# Patient Record
Sex: FEMALE | Race: WHITE | NOT HISPANIC OR LATINO | Employment: OTHER | URBAN - METROPOLITAN AREA
[De-identification: names, ages, dates, MRNs, and addresses within clinical notes are randomized per-mention and may not be internally consistent; named-entity substitution may affect disease eponyms.]

---

## 2018-08-09 ENCOUNTER — PES CALL (OUTPATIENT)
Dept: ADMINISTRATIVE | Facility: CLINIC | Age: 83
End: 2018-08-09

## 2019-01-16 ENCOUNTER — HOSPITAL ENCOUNTER (EMERGENCY)
Facility: HOSPITAL | Age: 84
Discharge: NURSING, REHAB OR LONG-TERM CARE | End: 2019-01-16
Attending: EMERGENCY MEDICINE
Payer: MEDICARE

## 2019-01-16 VITALS
HEIGHT: 69 IN | OXYGEN SATURATION: 95 % | DIASTOLIC BLOOD PRESSURE: 80 MMHG | HEART RATE: 84 BPM | TEMPERATURE: 98 F | BODY MASS INDEX: 21.33 KG/M2 | SYSTOLIC BLOOD PRESSURE: 150 MMHG | RESPIRATION RATE: 18 BRPM | WEIGHT: 144 LBS
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
## 2019-01-16 DIAGNOSIS — F03.90 DEMENTIA WITHOUT BEHAVIORAL DISTURBANCE, UNSPECIFIED DEMENTIA TYPE: ICD-10-CM

## 2019-01-16 PROCEDURE — 87086 URINE CULTURE/COLONY COUNT: CPT | Mod: HCNC

## 2019-01-16 PROCEDURE — 99284 EMERGENCY DEPT VISIT MOD MDM: CPT | Mod: HCNC

## 2019-01-16 PROCEDURE — 87088 URINE BACTERIA CULTURE: CPT | Mod: HCNC

## 2019-01-16 PROCEDURE — 87186 SC STD MICRODIL/AGAR DIL: CPT | Mod: HCNC

## 2019-01-16 PROCEDURE — 87077 CULTURE AEROBIC IDENTIFY: CPT | Mod: HCNC

## 2019-01-16 NOTE — ED PROVIDER NOTES
Encounter Date: 1/16/2019       History     Chief Complaint   Patient presents with    Fall     fropm [facility], from low bed to floor, reports back and buttock pain but upon being picked up from floor pt reports pain is gone, unwitnessed      This is an emergent evaluation of an 87-year-old female who presents via [facility] having slipped out of her low bed onto the floor.  When EMS arrived, the patient was lying on the floor.  When they picked her up, she had no complaints. The patient suffers from dementia and this limits her history.    PMH: CAD, SLE, ITP, osteoporosis, osteoarthritis, DDD, hammer toe, depression  PSH: splenectomy, hysterectomy, hip surgery, knee surgery          Review of patient's allergies indicates:   Allergen Reactions    Penicillins Rash     Past Medical History:   Diagnosis Date    Allergy     Arthritis     CAD (coronary artery disease) 11/26/2013    Chronic pain 5/1/2013    DDD (degenerative disc disease), thoracic 10/4/2012    Depression 5/1/2013    Encounter for blood transfusion     Hammer toe 5/1/2013    Idiopathic thrombocytopenic purpura (ITP) 1/28/2013    Osteoarthritis 10/4/2012    Osteoporosis, postmenopausal 11/26/2013    Positive NAZANIN (antinuclear antibody) 1/28/2013    SLE (systemic lupus erythematosus) 5/1/2013    Spondylolysis of lumbar region 10/4/2012     Past Surgical History:   Procedure Laterality Date    ARTHROPLASTY-HIP Left 4/16/2015    Performed by [facility] at [facility] OR    ARTHROPLASTY-HIP-AGATA (ENDOPROSTHESIS) Left 3/24/2015    Performed by Joao Hilliard III, MD at [facility] OR    BIOPSY Left 4/16/2015    [facility] by [facility] at [facility] OR    CYSTOSCOPY with bilateral  RETROGRADE and bilateral URETEROSCOPY with cystolithopaxy 2cm,  RIGHT holmium laser litho, bilateral STENT, villa  PLACEMENT Bilateral 6/9/2015    Performed by Maik Mckeon [facility] at [facility] OR    CYSTOSCOPY WITH RETROGRADE PYELOGRAM  Bilateral 2015    [facility]    HYSTERECTOMY      INCISION AND DRAINAGE (I & D) Left 2015    [facility]formed by [facility] at [facility] OR    PLACEMENT ROSENTHAL CATHETER N/A 2015    [facility]formed by [facility] at [facility] OR    PLACEMENT-STENT URETERAL Right 2015    Performed by [facility] at [facility] OR    REVISION-ARTHROPLASTY-HIP AGATA TO TOTAL Left 2015    [facility]formed by [facility] at [facility] OR    right hip athroplasty Right     SPLENECTOMY, TOTAL      TOTAL KNEE ARTHROPLASTY Left      Family History   Problem Relation Age of Onset    Arthritis Mother     Cancer Sister     Cancer Brother      Social History     Tobacco Use    Smoking status: Former Smoker     Start date: 1950     Last attempt to quit: 1964     Years since quittin.9    Smokeless tobacco: Never Used   Substance Use Topics    Alcohol use: No    Drug use: No     Review of Systems   Psychiatric/Behavioral: Positive for confusion (baseline).       Physical Exam     Initial Vitals [19 0344]   BP Pulse Resp Temp SpO2   (!) 140/89 74 16 97.6 °F (36.4 °C) 98 %      MAP       --         Physical Exam    Nursing note and vitals reviewed.  Constitutional: She appears well-developed and well-nourished. She is not diaphoretic.   Awake, alert, nontoxic elderly woman   HENT:   Head: Normocephalic and atraumatic.   Mouth/Throat: Oropharynx is clear and moist.   Eyes: Conjunctivae and EOM are normal. Pupils are equal, round, and reactive to light.   Neck: Normal range of motion. Neck supple.   Cardiovascular: Normal rate, regular rhythm and intact distal pulses.   Pulmonary/Chest: Breath sounds normal. No respiratory distress. She has no wheezes. She has no rhonchi. She has no rales.   Abdominal: Soft. There is no tenderness.   Musculoskeletal: Normal range of motion. She exhibits no tenderness.   Bilateral hammer toes. Compression socks bilat.    Neurological: She is alert. She has normal strength.   Moving all extremities. Patient states she lives in [place] on a property her  bought 3 years ago.   Skin: Skin is warm and dry. No erythema. No pallor.   Healing skin tear LUE.   Psychiatric: Her behavior is normal.         ED Course   Procedures  Labs Reviewed - No data to display       Imaging Results    None       X-Rays:   Independently Interpreted Readings:   Other Readings:  CXR NAD    Medical Decision Making:   History:   I obtained history from: EMS provider.  Old Medical Records: I decided to obtain old medical records.  Old Records Summarized: records from clinic visits and records from previous admission(s).  Initial Assessment:   87 y.o. Female with dementia s/p minor fall.  Differential Diagnosis:   Ddx includes ICH, skull fx, cspine fx, rib fx, PTX, pelvic fx, hip fx, other.  Independently Interpreted Test(s):   I have ordered and independently interpreted X-rays - see prior notes.  Clinical Tests:   Lab Tests: Ordered  Radiological Study: Ordered and Reviewed  ED Management:  Imaging reassuring.     Vitals also reassuring -- minimally hypertensive.     RN did note foul-smelling urine so we sent this for culture. Patient has grown out multiple organisms in the past and as she is not febrile, tachycardic, or altered from her baseline, I feel waiting for cx to tx is reasonable.    Dc'ed back to NH via EMS.                       Clinical Impression:   The primary encounter diagnosis was Fall. A diagnosis of Dementia without behavioral disturbance, unspecified dementia type was also pertinent to this visit.                             Marian White MD  01/18/19 0002

## 2019-01-16 NOTE — ED TRIAGE NOTES
Pt presents to ED via EMS from [facility] after a unwitnessed fall. Pt was found on floor by staff, bed was in lowest position. Pt reported pain in lower back and sacral area but reports pain went away upon being removed from floor. Pt is disoriented which staff report is baseline. Pt in no acute distress at this time. Will continue to be monitored.

## 2019-01-18 ENCOUNTER — TELEPHONE (OUTPATIENT)
Dept: EMERGENCY MEDICINE | Facility: HOSPITAL | Age: 84
End: 2019-01-18

## 2019-01-18 LAB — BACTERIA UR CULT: NORMAL

## 2019-01-18 NOTE — TELEPHONE ENCOUNTER
Pt is resident of [facility]. Spoke with Zara Jacob LPN. Prescribing Macrobid 100mg PO bid x7 days for +urine culture.

## 2019-02-08 ENCOUNTER — PES CALL (OUTPATIENT)
Dept: ADMINISTRATIVE | Facility: CLINIC | Age: 84
End: 2019-02-08

## 2019-03-17 ENCOUNTER — HOSPITAL ENCOUNTER (INPATIENT)
Facility: HOSPITAL | Age: 84
LOS: 5 days | Discharge: NURSING, REHAB OR LONG-TERM CARE | DRG: 956 | End: 2019-03-22
Attending: EMERGENCY MEDICINE | Admitting: EMERGENCY MEDICINE
Payer: MEDICARE

## 2019-03-17 DIAGNOSIS — Z01.818 PRE-OP EXAM: ICD-10-CM

## 2019-03-17 DIAGNOSIS — M79.605 LEFT LEG PAIN: ICD-10-CM
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
## 2019-03-17 DIAGNOSIS — S72.402A CLOSED FRACTURE OF DISTAL END OF LEFT FEMUR, UNSPECIFIED FRACTURE MORPHOLOGY, INITIAL ENCOUNTER: Primary | ICD-10-CM

## 2019-03-17 LAB
ALBUMIN SERPL BCP-MCNC: 3.3 G/DL
ALP SERPL-CCNC: 161 U/L
ALT SERPL W/O P-5'-P-CCNC: 8 U/L
ANION GAP SERPL CALC-SCNC: 10 MMOL/L
AST SERPL-CCNC: 19 U/L
BASOPHILS # BLD AUTO: 0.05 K/UL
BASOPHILS NFR BLD: 0.6 %
BILIRUB SERPL-MCNC: 0.5 MG/DL
BUN SERPL-MCNC: 26 MG/DL
CALCIUM SERPL-MCNC: 9.7 MG/DL
CHLORIDE SERPL-SCNC: 107 MMOL/L
CO2 SERPL-SCNC: 26 MMOL/L
CREAT SERPL-MCNC: 0.9 MG/DL
DIFFERENTIAL METHOD: ABNORMAL
EOSINOPHIL # BLD AUTO: 0.1 K/UL
EOSINOPHIL NFR BLD: 1.5 %
ERYTHROCYTE [DISTWIDTH] IN BLOOD BY AUTOMATED COUNT: 15.4 %
EST. GFR  (AFRICAN AMERICAN): >60 ML/MIN/1.73 M^2
EST. GFR  (NON AFRICAN AMERICAN): 58 ML/MIN/1.73 M^2
GLUCOSE SERPL-MCNC: 94 MG/DL
HCT VFR BLD AUTO: 39.1 %
HGB BLD-MCNC: 12.5 G/DL
INR PPP: 1.1
LYMPHOCYTES # BLD AUTO: 2.3 K/UL
LYMPHOCYTES NFR BLD: 26.9 %
MCH RBC QN AUTO: 30.7 PG
MCHC RBC AUTO-ENTMCNC: 32 G/DL
MCV RBC AUTO: 96 FL
MONOCYTES # BLD AUTO: 0.6 K/UL
MONOCYTES NFR BLD: 7.2 %
NEUTROPHILS # BLD AUTO: 5.4 K/UL
NEUTROPHILS NFR BLD: 63.8 %
PLATELET # BLD AUTO: 305 K/UL
PMV BLD AUTO: 9.7 FL
POTASSIUM SERPL-SCNC: 4.5 MMOL/L
PROT SERPL-MCNC: 8 G/DL
PROTHROMBIN TIME: 11.4 SEC
RBC # BLD AUTO: 4.07 M/UL
SODIUM SERPL-SCNC: 143 MMOL/L
WBC # BLD AUTO: 8.39 K/UL

## 2019-03-17 PROCEDURE — 11000001 HC ACUTE MED/SURG PRIVATE ROOM: Mod: HCNC

## 2019-03-17 PROCEDURE — 80053 COMPREHEN METABOLIC PANEL: CPT | Mod: HCNC

## 2019-03-17 PROCEDURE — 96374 THER/PROPH/DIAG INJ IV PUSH: CPT | Mod: HCNC

## 2019-03-17 PROCEDURE — 93010 ELECTROCARDIOGRAM REPORT: CPT | Mod: HCNC,,, | Performed by: INTERNAL MEDICINE

## 2019-03-17 PROCEDURE — 93005 ELECTROCARDIOGRAM TRACING: CPT | Mod: HCNC

## 2019-03-17 PROCEDURE — 63600175 PHARM REV CODE 636 W HCPCS: Mod: HCNC | Performed by: EMERGENCY MEDICINE

## 2019-03-17 PROCEDURE — 85025 COMPLETE CBC W/AUTO DIFF WBC: CPT | Mod: HCNC

## 2019-03-17 PROCEDURE — 93010 EKG 12-LEAD: ICD-10-PCS | Mod: HCNC,,, | Performed by: INTERNAL MEDICINE

## 2019-03-17 PROCEDURE — 85610 PROTHROMBIN TIME: CPT | Mod: HCNC

## 2019-03-17 PROCEDURE — 99285 EMERGENCY DEPT VISIT HI MDM: CPT | Mod: 25,HCNC

## 2019-03-17 RX ORDER — SODIUM CHLORIDE 9 MG/ML
1000 INJECTION, SOLUTION INTRAVENOUS
Status: ACTIVE | OUTPATIENT
Start: 2019-03-17 | End: 2019-03-18

## 2019-03-17 RX ORDER — SODIUM CHLORIDE 0.9 % (FLUSH) 0.9 %
5 SYRINGE (ML) INJECTION
Status: DISCONTINUED | OUTPATIENT
Start: 2019-03-17 | End: 2019-03-22 | Stop reason: HOSPADM

## 2019-03-17 RX ORDER — MORPHINE SULFATE 10 MG/ML
2 INJECTION INTRAMUSCULAR; INTRAVENOUS; SUBCUTANEOUS EVERY 4 HOURS PRN
Status: DISCONTINUED | OUTPATIENT
Start: 2019-03-17 | End: 2019-03-18

## 2019-03-17 RX ORDER — ONDANSETRON 2 MG/ML
4 INJECTION INTRAMUSCULAR; INTRAVENOUS EVERY 8 HOURS PRN
Status: DISCONTINUED | OUTPATIENT
Start: 2019-03-17 | End: 2019-03-18

## 2019-03-17 RX ORDER — ACETAMINOPHEN 325 MG/1
650 TABLET ORAL EVERY 8 HOURS PRN
Status: DISCONTINUED | OUTPATIENT
Start: 2019-03-17 | End: 2019-03-19

## 2019-03-17 RX ORDER — MORPHINE SULFATE 10 MG/ML
2 INJECTION INTRAMUSCULAR; INTRAVENOUS; SUBCUTANEOUS
Status: COMPLETED | OUTPATIENT
Start: 2019-03-17 | End: 2019-03-17

## 2019-03-17 RX ORDER — PANTOPRAZOLE SODIUM 40 MG/10ML
40 INJECTION, POWDER, LYOPHILIZED, FOR SOLUTION INTRAVENOUS DAILY
Status: DISCONTINUED | OUTPATIENT
Start: 2019-03-18 | End: 2019-03-22 | Stop reason: HOSPADM

## 2019-03-17 RX ADMIN — MORPHINE SULFATE 2 MG: 10 INJECTION INTRAVENOUS at 06:03

## 2019-03-17 NOTE — ED PROVIDER NOTES
Encounter Date: 3/17/2019       History     Chief Complaint   Patient presents with    Fall     From [facility] after trying to transfer from her bed to wheelchair. Fall was unwitnessed. Pt c/o lower back and left knee pain     Ms Sabrina reports her L knee hurts. She states she was out and about with her  and some friends and she had a little fall and came here.  She says she like for someone to help her get dressed to she can go back home but she hurts in her knee and her leg a little bit.  EMS states she told nursing home and EMS personnel that she was transferring from bed to wheelchair and she slipped and fell and hurt in her low back and her left knee a little.  MS states the fall was unwitnessed.  Patient has a history of dementia and is at her baseline mentally per the nursing home.  She tells me her left knee hurts and she points to her distal left thigh and knee.      The history is provided by the patient and the EMS personnel.     Review of patient's allergies indicates:   Allergen Reactions    Penicillins Rash     Past Medical History:   Diagnosis Date    Allergy     Arthritis     CAD (coronary artery disease) 11/26/2013    Chronic pain 5/1/2013    DDD (degenerative disc disease), thoracic 10/4/2012    Depression 5/1/2013    Encounter for blood transfusion     Hammer toe 5/1/2013    Idiopathic thrombocytopenic purpura (ITP) 1/28/2013    Osteoarthritis 10/4/2012    Osteoporosis, postmenopausal 11/26/2013    Positive NAZANIN (antinuclear antibody) 1/28/2013    SLE (systemic lupus erythematosus) 5/1/2013    Spondylolysis of lumbar region 10/4/2012     Past Surgical History:   Procedure Laterality Date    ARTHROPLASTY-HIP Left 4/16/2015    [facility]    ARTHROPLASTY-HIP-AGATA (ENDOPROSTHESIS) Left 3/24/2015    Performed by [facility]ao Hilliard III, MD at [facility] OR    BIOPSY Left 4/16/2015    Performed by Hsamukh [facility]bar [facility] at [facility] OR    CYSTOSCOPY  with bilateral  RETROGRADE and bilateral URETEROSCOPY with cystolithopaxy 2cm,  RIGHT holmium laser litho, bilateral STENT, villa  PLACEMENT Bilateral 2015    [facility] by [facility] at [facility] OR    CYSTOSCOPY WITH RETROGRADE PYELOGRAM Bilateral 2015    Performed by [facility] at [facility] OR    HYSTERECTOMY      INCISION AND DRAINAGE (I & D) Left 2015    [facility]formed by [facility] at [facility] OR    PLACEMENT VILLA CATHETER N/A 2015    Performed by [facility] at [facility] OR    PLACEMENT-STENT URETERAL Right 2015    [facility]formed by [facility] at [facility] OR    REVISION-ARTHROPLASTY-HIP AGATA TO TOTAL Left 2015    [facility]formed by [facility] at [facility] OR    right hip athroplasty Right     SPLENECTOMY, TOTAL      TOTAL KNEE ARTHROPLASTY Left      Family History   Problem Relation Age of Onset    Arthritis Mother     Cancer Sister     Cancer Brother      Social History     Tobacco Use    Smoking status: Former Smoker     Start date: 1950     Last attempt to quit: 1964     Years since quittin.0    Smokeless tobacco: Never Used   Substance Use Topics    Alcohol use: No    Drug use: No     Review of Systems   Reason unable to perform ROS: Unable to obtain a full ROS due to dementia.   Musculoskeletal:        Positive reported left knee pain and thigh pain per patient   All other systems reviewed and are negative.      Physical Exam     Initial Vitals [19 1606]   BP Pulse Resp Temp SpO2   126/83 84 18 98.9 °F (37.2 °C) 96 %      MAP       --         Physical Exam    Nursing note and vitals reviewed.  Constitutional: She appears well-developed and well-nourished.   Polite, anxious, confused.   HENT:   Head: Normocephalic and atraumatic.   Eyes: Conjunctivae are normal.   Cardiovascular: Normal rate and regular rhythm.   No murmur heard.  Pulmonary/Chest: Breath sounds normal. No respiratory distress.   Abdominal:  Soft. She exhibits no distension.   Musculoskeletal:   Knees and hips are flexed with moderate contractions at knees bilaterally. Pain with passive extension of left knee and left hip.  Mild erythema about knee diffusely with mild knee and distal thigh swelling compared with the right.   Neurological:   Awake, oriented to person and intermittently to place.  Confused.   Skin: Skin is warm and dry.   Psychiatric: She has a normal mood and affect.   Polite and calm.         ED Course   Procedures  Labs Reviewed - No data to display       Imaging Results    None          Medical Decision Making:   Clinical Tests:   Lab Tests: Ordered and Reviewed  Radiological Study: Ordered and Reviewed  ED Management:  Distal femur fracture noted and discussed with patient's daughter.  Ortho was consulted and I spoke with Dr. Posada.  Patient will be NPO past midnight.  She will be given some pain control.  She will be cleared for surgery this evening.  I have spoken with Dr. Tyler and have written admission orders. No other abnl noted.                       Clinical Impression:       ICD-10-CM ICD-9-CM   1. Closed fracture of distal end of left femur, unspecified fracture morphology, initial encounter S72.402A 821.20   2. Fall W19.XXXA E888.9   3. Left leg pain M79.605 729.5   4. Pre-op exam Z01.818 V72.84   5. Pre-op exam Z01.818 V72.84                                Tracy Hathaway MD  03/17/19 [id number]

## 2019-03-17 NOTE — ED TRIAGE NOTES
87 y.o. Female presents to the ED with chief complaint of fall. Patient from [facility]. EMS reports patient was transferring from her bed to her wheelchair when she suddenly fell. Unwitnessed fall. Patient c/o lower back pain bilaterally with left knee pain. Patient denies hitting head. Patient has hx of dementia. Patient resting in bed in NAD. Side rails up x2.

## 2019-03-18 ENCOUNTER — ANESTHESIA EVENT (OUTPATIENT)
Dept: SURGERY | Facility: HOSPITAL | Age: 84
DRG: 956 | End: 2019-03-18
Payer: MEDICARE

## 2019-03-18 ENCOUNTER — ANESTHESIA (OUTPATIENT)
Dept: SURGERY | Facility: HOSPITAL | Age: 84
DRG: 956 | End: 2019-03-18
Payer: MEDICARE

## 2019-03-18 LAB
ABO + RH BLD: NORMAL
ALBUMIN SERPL BCP-MCNC: 2.8 G/DL
ALP SERPL-CCNC: 123 U/L
ALT SERPL W/O P-5'-P-CCNC: 7 U/L
ANION GAP SERPL CALC-SCNC: 4 MMOL/L
ANION GAP SERPL CALC-SCNC: 7 MMOL/L
AST SERPL-CCNC: 15 U/L
BASOPHILS # BLD AUTO: 0.02 K/UL
BASOPHILS # BLD AUTO: 0.03 K/UL
BASOPHILS NFR BLD: 0.2 %
BASOPHILS NFR BLD: 0.3 %
BILIRUB SERPL-MCNC: 0.6 MG/DL
BLD GP AB SCN CELLS X3 SERPL QL: NORMAL
BLD PROD TYP BPU: NORMAL
BLOOD UNIT EXPIRATION DATE: NORMAL
BLOOD UNIT TYPE CODE: 5100
BLOOD UNIT TYPE: NORMAL
BUN SERPL-MCNC: 19 MG/DL
BUN SERPL-MCNC: 23 MG/DL
CALCIUM SERPL-MCNC: 7.8 MG/DL
CALCIUM SERPL-MCNC: 8.8 MG/DL
CHLORIDE SERPL-SCNC: 108 MMOL/L
CHLORIDE SERPL-SCNC: 115 MMOL/L
CO2 SERPL-SCNC: 21 MMOL/L
CO2 SERPL-SCNC: 28 MMOL/L
CODING SYSTEM: NORMAL
CREAT SERPL-MCNC: 0.8 MG/DL
CREAT SERPL-MCNC: 0.9 MG/DL
DIFFERENTIAL METHOD: ABNORMAL
DIFFERENTIAL METHOD: ABNORMAL
DISPENSE STATUS: NORMAL
EOSINOPHIL # BLD AUTO: 0.1 K/UL
EOSINOPHIL # BLD AUTO: 0.1 K/UL
EOSINOPHIL NFR BLD: 0.5 %
EOSINOPHIL NFR BLD: 0.9 %
ERYTHROCYTE [DISTWIDTH] IN BLOOD BY AUTOMATED COUNT: 15.4 %
ERYTHROCYTE [DISTWIDTH] IN BLOOD BY AUTOMATED COUNT: 15.6 %
EST. GFR  (AFRICAN AMERICAN): >60 ML/MIN/1.73 M^2
EST. GFR  (AFRICAN AMERICAN): >60 ML/MIN/1.73 M^2
EST. GFR  (NON AFRICAN AMERICAN): 58 ML/MIN/1.73 M^2
EST. GFR  (NON AFRICAN AMERICAN): >60 ML/MIN/1.73 M^2
GLUCOSE SERPL-MCNC: 108 MG/DL
GLUCOSE SERPL-MCNC: 116 MG/DL
HCT VFR BLD AUTO: 31.4 %
HCT VFR BLD AUTO: 33.5 %
HGB BLD-MCNC: 10.5 G/DL
HGB BLD-MCNC: 9.9 G/DL
LYMPHOCYTES # BLD AUTO: 2.1 K/UL
LYMPHOCYTES # BLD AUTO: 2.2 K/UL
LYMPHOCYTES NFR BLD: 15.4 %
LYMPHOCYTES NFR BLD: 27.3 %
MCH RBC QN AUTO: 30.3 PG
MCH RBC QN AUTO: 30.7 PG
MCHC RBC AUTO-ENTMCNC: 31.3 G/DL
MCHC RBC AUTO-ENTMCNC: 31.5 G/DL
MCV RBC AUTO: 97 FL
MCV RBC AUTO: 98 FL
MONOCYTES # BLD AUTO: 0.7 K/UL
MONOCYTES # BLD AUTO: 1.1 K/UL
MONOCYTES NFR BLD: 7.8 %
MONOCYTES NFR BLD: 9.4 %
NEUTROPHILS # BLD AUTO: 10.3 K/UL
NEUTROPHILS # BLD AUTO: 4.9 K/UL
NEUTROPHILS NFR BLD: 62.1 %
NEUTROPHILS NFR BLD: 76.1 %
PLATELET # BLD AUTO: 201 K/UL
PLATELET # BLD AUTO: 286 K/UL
PMV BLD AUTO: 10 FL
PMV BLD AUTO: 9.7 FL
POTASSIUM SERPL-SCNC: 4.1 MMOL/L
POTASSIUM SERPL-SCNC: 4.3 MMOL/L
PROT SERPL-MCNC: 6.5 G/DL
RBC # BLD AUTO: 3.22 M/UL
RBC # BLD AUTO: 3.47 M/UL
SODIUM SERPL-SCNC: 140 MMOL/L
SODIUM SERPL-SCNC: 143 MMOL/L
TRANS ERYTHROCYTES VOL PATIENT: NORMAL ML
WBC # BLD AUTO: 13.57 K/UL
WBC # BLD AUTO: 7.88 K/UL

## 2019-03-18 PROCEDURE — 63600175 PHARM REV CODE 636 W HCPCS: Mod: HCNC | Performed by: ANESTHESIOLOGY

## 2019-03-18 PROCEDURE — C9113 INJ PANTOPRAZOLE SODIUM, VIA: HCPCS | Mod: HCNC | Performed by: EMERGENCY MEDICINE

## 2019-03-18 PROCEDURE — 63600175 PHARM REV CODE 636 W HCPCS: Mod: HCNC | Performed by: ORTHOPAEDIC SURGERY

## 2019-03-18 PROCEDURE — 25000003 PHARM REV CODE 250: Mod: HCNC | Performed by: REGISTERED NURSE

## 2019-03-18 PROCEDURE — 36415 COLL VENOUS BLD VENIPUNCTURE: CPT | Mod: HCNC

## 2019-03-18 PROCEDURE — 25000003 PHARM REV CODE 250: Mod: HCNC | Performed by: HOSPITALIST

## 2019-03-18 PROCEDURE — 86901 BLOOD TYPING SEROLOGIC RH(D): CPT | Mod: HCNC

## 2019-03-18 PROCEDURE — 25000003 PHARM REV CODE 250: Mod: HCNC | Performed by: NURSE ANESTHETIST, CERTIFIED REGISTERED

## 2019-03-18 PROCEDURE — 63600175 PHARM REV CODE 636 W HCPCS: Mod: HCNC | Performed by: NURSE ANESTHETIST, CERTIFIED REGISTERED

## 2019-03-18 PROCEDURE — D9220A PRA ANESTHESIA: ICD-10-PCS | Mod: HCNC,ANES,, | Performed by: ANESTHESIOLOGY

## 2019-03-18 PROCEDURE — 25000003 PHARM REV CODE 250: Mod: HCNC | Performed by: ORTHOPAEDIC SURGERY

## 2019-03-18 PROCEDURE — P9021 RED BLOOD CELLS UNIT: HCPCS | Mod: HCNC

## 2019-03-18 PROCEDURE — 80048 BASIC METABOLIC PNL TOTAL CA: CPT | Mod: HCNC

## 2019-03-18 PROCEDURE — 36000710: Mod: HCNC | Performed by: ORTHOPAEDIC SURGERY

## 2019-03-18 PROCEDURE — 80053 COMPREHEN METABOLIC PANEL: CPT | Mod: HCNC

## 2019-03-18 PROCEDURE — 63600175 PHARM REV CODE 636 W HCPCS: Mod: HCNC | Performed by: REGISTERED NURSE

## 2019-03-18 PROCEDURE — C9290 INJ, BUPIVACAINE LIPOSOME: HCPCS | Mod: HCNC | Performed by: ORTHOPAEDIC SURGERY

## 2019-03-18 PROCEDURE — 11000001 HC ACUTE MED/SURG PRIVATE ROOM: Mod: HCNC

## 2019-03-18 PROCEDURE — 36000711: Mod: HCNC | Performed by: ORTHOPAEDIC SURGERY

## 2019-03-18 PROCEDURE — C1713 ANCHOR/SCREW BN/BN,TIS/BN: HCPCS | Mod: HCNC | Performed by: ORTHOPAEDIC SURGERY

## 2019-03-18 PROCEDURE — 63600175 PHARM REV CODE 636 W HCPCS: Mod: HCNC | Performed by: EMERGENCY MEDICINE

## 2019-03-18 PROCEDURE — 37000008 HC ANESTHESIA 1ST 15 MINUTES: Mod: HCNC | Performed by: ORTHOPAEDIC SURGERY

## 2019-03-18 PROCEDURE — 85025 COMPLETE CBC W/AUTO DIFF WBC: CPT | Mod: 91,HCNC

## 2019-03-18 PROCEDURE — 71000033 HC RECOVERY, INTIAL HOUR: Mod: HCNC | Performed by: ORTHOPAEDIC SURGERY

## 2019-03-18 PROCEDURE — 63600175 PHARM REV CODE 636 W HCPCS: Mod: HCNC | Performed by: HOSPITALIST

## 2019-03-18 PROCEDURE — 86920 COMPATIBILITY TEST SPIN: CPT | Mod: HCNC

## 2019-03-18 PROCEDURE — 27201423 OPTIME MED/SURG SUP & DEVICES STERILE SUPPLY: Mod: HCNC | Performed by: ORTHOPAEDIC SURGERY

## 2019-03-18 PROCEDURE — D9220A PRA ANESTHESIA: Mod: HCNC,ANES,, | Performed by: ANESTHESIOLOGY

## 2019-03-18 PROCEDURE — 37000009 HC ANESTHESIA EA ADD 15 MINS: Mod: HCNC | Performed by: ORTHOPAEDIC SURGERY

## 2019-03-18 PROCEDURE — C1769 GUIDE WIRE: HCPCS | Mod: HCNC | Performed by: ORTHOPAEDIC SURGERY

## 2019-03-18 DEVICE — SCREW CANN VA LOK 5X60MM: Type: IMPLANTABLE DEVICE | Site: LEG | Status: FUNCTIONAL

## 2019-03-18 DEVICE — SCREW CORTEX 4.5 48M: Type: IMPLANTABLE DEVICE | Site: LEG | Status: FUNCTIONAL

## 2019-03-18 DEVICE — CEMENT BONE W/GENT SIMPLEX HV: Type: IMPLANTABLE DEVICE | Site: LEG | Status: FUNCTIONAL

## 2019-03-18 DEVICE — SCREW STRDRV VA LOK 5X40MM: Type: IMPLANTABLE DEVICE | Site: LEG | Status: FUNCTIONAL

## 2019-03-18 DEVICE — SCREW CANN VA LOK 5X65MM: Type: IMPLANTABLE DEVICE | Site: LEG | Status: FUNCTIONAL

## 2019-03-18 DEVICE — SCREW BONE VA LK 5.0X38MM: Type: IMPLANTABLE DEVICE | Site: LEG | Status: FUNCTIONAL

## 2019-03-18 RX ORDER — PHENYLEPHRINE HYDROCHLORIDE 10 MG/ML
INJECTION INTRAVENOUS
Status: DISCONTINUED | OUTPATIENT
Start: 2019-03-18 | End: 2019-03-18

## 2019-03-18 RX ORDER — ONDANSETRON 2 MG/ML
INJECTION INTRAMUSCULAR; INTRAVENOUS
Status: DISCONTINUED | OUTPATIENT
Start: 2019-03-18 | End: 2019-03-18

## 2019-03-18 RX ORDER — SODIUM CHLORIDE 0.9 % (FLUSH) 0.9 %
5 SYRINGE (ML) INJECTION
Status: DISCONTINUED | OUTPATIENT
Start: 2019-03-18 | End: 2019-03-22 | Stop reason: HOSPADM

## 2019-03-18 RX ORDER — MORPHINE SULFATE 10 MG/ML
2 INJECTION INTRAMUSCULAR; INTRAVENOUS; SUBCUTANEOUS EVERY 5 MIN PRN
Status: DISCONTINUED | OUTPATIENT
Start: 2019-03-18 | End: 2019-03-18 | Stop reason: HOSPADM

## 2019-03-18 RX ORDER — MORPHINE SULFATE 10 MG/ML
2 INJECTION INTRAMUSCULAR; INTRAVENOUS; SUBCUTANEOUS EVERY 4 HOURS PRN
Status: DISCONTINUED | OUTPATIENT
Start: 2019-03-18 | End: 2019-03-19

## 2019-03-18 RX ORDER — VANCOMYCIN HCL IN 5 % DEXTROSE 1G/250ML
1000 PLASTIC BAG, INJECTION (ML) INTRAVENOUS
Status: COMPLETED | OUTPATIENT
Start: 2019-03-19 | End: 2019-03-19

## 2019-03-18 RX ORDER — ROCURONIUM BROMIDE 10 MG/ML
INJECTION, SOLUTION INTRAVENOUS
Status: DISCONTINUED | OUTPATIENT
Start: 2019-03-18 | End: 2019-03-18

## 2019-03-18 RX ORDER — ACETAMINOPHEN 10 MG/ML
1000 INJECTION, SOLUTION INTRAVENOUS ONCE
Status: COMPLETED | OUTPATIENT
Start: 2019-03-18 | End: 2019-03-18

## 2019-03-18 RX ORDER — BACITRACIN 50000 [IU]/1
INJECTION, POWDER, FOR SOLUTION INTRAMUSCULAR
Status: DISCONTINUED | OUTPATIENT
Start: 2019-03-18 | End: 2019-03-18 | Stop reason: HOSPADM

## 2019-03-18 RX ORDER — FENTANYL CITRATE 50 UG/ML
INJECTION, SOLUTION INTRAMUSCULAR; INTRAVENOUS
Status: DISCONTINUED | OUTPATIENT
Start: 2019-03-18 | End: 2019-03-18

## 2019-03-18 RX ORDER — GLYCOPYRROLATE 0.2 MG/ML
INJECTION INTRAMUSCULAR; INTRAVENOUS
Status: DISCONTINUED | OUTPATIENT
Start: 2019-03-18 | End: 2019-03-18

## 2019-03-18 RX ORDER — HYDROCODONE BITARTRATE AND ACETAMINOPHEN 500; 5 MG/1; MG/1
TABLET ORAL
Status: DISCONTINUED | OUTPATIENT
Start: 2019-03-18 | End: 2019-03-18 | Stop reason: HOSPADM

## 2019-03-18 RX ORDER — LIDOCAINE HCL/PF 100 MG/5ML
SYRINGE (ML) INTRAVENOUS
Status: DISCONTINUED | OUTPATIENT
Start: 2019-03-18 | End: 2019-03-18

## 2019-03-18 RX ORDER — BUPIVACAINE HYDROCHLORIDE 2.5 MG/ML
INJECTION, SOLUTION EPIDURAL; INFILTRATION; INTRACAUDAL
Status: DISCONTINUED | OUTPATIENT
Start: 2019-03-18 | End: 2019-03-18 | Stop reason: HOSPADM

## 2019-03-18 RX ORDER — OXYCODONE AND ACETAMINOPHEN 5; 325 MG/1; MG/1
1 TABLET ORAL EVERY 6 HOURS PRN
Status: DISCONTINUED | OUTPATIENT
Start: 2019-03-18 | End: 2019-03-18

## 2019-03-18 RX ORDER — ONDANSETRON 2 MG/ML
8 INJECTION INTRAMUSCULAR; INTRAVENOUS EVERY 8 HOURS PRN
Status: DISCONTINUED | OUTPATIENT
Start: 2019-03-18 | End: 2019-03-22 | Stop reason: HOSPADM

## 2019-03-18 RX ORDER — MUPIROCIN 20 MG/G
1 OINTMENT TOPICAL 2 TIMES DAILY
Status: DISCONTINUED | OUTPATIENT
Start: 2019-03-18 | End: 2019-03-22 | Stop reason: HOSPADM

## 2019-03-18 RX ORDER — SODIUM CHLORIDE 0.9 % (FLUSH) 0.9 %
3 SYRINGE (ML) INJECTION
Status: DISCONTINUED | OUTPATIENT
Start: 2019-03-18 | End: 2019-03-22 | Stop reason: HOSPADM

## 2019-03-18 RX ORDER — SODIUM CHLORIDE 9 MG/ML
INJECTION, SOLUTION INTRAVENOUS CONTINUOUS
Status: DISCONTINUED | OUTPATIENT
Start: 2019-03-18 | End: 2019-03-19

## 2019-03-18 RX ORDER — HYDROCODONE BITARTRATE AND ACETAMINOPHEN 5; 325 MG/1; MG/1
1 TABLET ORAL EVERY 4 HOURS PRN
Status: DISCONTINUED | OUTPATIENT
Start: 2019-03-18 | End: 2019-03-19

## 2019-03-18 RX ORDER — NEOSTIGMINE METHYLSULFATE 1 MG/ML
INJECTION, SOLUTION INTRAVENOUS
Status: DISCONTINUED | OUTPATIENT
Start: 2019-03-18 | End: 2019-03-18

## 2019-03-18 RX ORDER — PROPOFOL 10 MG/ML
VIAL (ML) INTRAVENOUS
Status: DISCONTINUED | OUTPATIENT
Start: 2019-03-18 | End: 2019-03-18

## 2019-03-18 RX ORDER — AMLODIPINE BESYLATE 5 MG/1
5 TABLET ORAL DAILY
Status: DISCONTINUED | OUTPATIENT
Start: 2019-03-18 | End: 2019-03-19

## 2019-03-18 RX ADMIN — PHENYLEPHRINE HYDROCHLORIDE 200 MCG: 10 INJECTION INTRAVENOUS at 06:03

## 2019-03-18 RX ADMIN — ROCURONIUM BROMIDE 15 MG: 10 INJECTION, SOLUTION INTRAVENOUS at 05:03

## 2019-03-18 RX ADMIN — ACETAMINOPHEN 1000 MG: 10 INJECTION, SOLUTION INTRAVENOUS at 10:03

## 2019-03-18 RX ADMIN — PHENYLEPHRINE HYDROCHLORIDE 200 MCG: 10 INJECTION INTRAVENOUS at 05:03

## 2019-03-18 RX ADMIN — ONDANSETRON 4 MG: 2 INJECTION, SOLUTION INTRAMUSCULAR; INTRAVENOUS at 07:03

## 2019-03-18 RX ADMIN — HYDROCORTISONE SODIUM SUCCINATE 200 MG: 100 INJECTION, POWDER, FOR SOLUTION INTRAMUSCULAR; INTRAVENOUS at 05:03

## 2019-03-18 RX ADMIN — FENTANYL CITRATE 50 MCG: 50 INJECTION INTRAMUSCULAR; INTRAVENOUS at 04:03

## 2019-03-18 RX ADMIN — LIDOCAINE HYDROCHLORIDE 75 MG: 20 INJECTION, SOLUTION INTRAVENOUS at 04:03

## 2019-03-18 RX ADMIN — Medication 1 G: at 04:03

## 2019-03-18 RX ADMIN — OXYCODONE AND ACETAMINOPHEN 1 TABLET: 5; 325 TABLET ORAL at 08:03

## 2019-03-18 RX ADMIN — PHENYLEPHRINE HYDROCHLORIDE 100 MCG: 10 INJECTION INTRAVENOUS at 07:03

## 2019-03-18 RX ADMIN — MORPHINE SULFATE 2 MG: 10 INJECTION INTRAVENOUS at 01:03

## 2019-03-18 RX ADMIN — ROCURONIUM BROMIDE 10 MG: 10 INJECTION, SOLUTION INTRAVENOUS at 05:03

## 2019-03-18 RX ADMIN — PHENYLEPHRINE HYDROCHLORIDE 200 MCG: 10 INJECTION INTRAVENOUS at 07:03

## 2019-03-18 RX ADMIN — SODIUM CHLORIDE: 0.9 INJECTION, SOLUTION INTRAVENOUS at 06:03

## 2019-03-18 RX ADMIN — ACETAMINOPHEN 1000 MG: 10 INJECTION, SOLUTION INTRAVENOUS at 07:03

## 2019-03-18 RX ADMIN — NEOSTIGMINE METHYLSULFATE 5 MG: 1 INJECTION INTRAVENOUS at 07:03

## 2019-03-18 RX ADMIN — MUPIROCIN 1 G: 20 OINTMENT TOPICAL at 10:03

## 2019-03-18 RX ADMIN — PANTOPRAZOLE SODIUM 40 MG: 40 INJECTION, POWDER, FOR SOLUTION INTRAVENOUS at 08:03

## 2019-03-18 RX ADMIN — SODIUM CHLORIDE: 0.9 INJECTION, SOLUTION INTRAVENOUS at 08:03

## 2019-03-18 RX ADMIN — AMLODIPINE BESYLATE 5 MG: 5 TABLET ORAL at 08:03

## 2019-03-18 RX ADMIN — ROCURONIUM BROMIDE 10 MG: 10 INJECTION, SOLUTION INTRAVENOUS at 06:03

## 2019-03-18 RX ADMIN — MORPHINE SULFATE 2 MG: 10 INJECTION INTRAVENOUS at 08:03

## 2019-03-18 RX ADMIN — PROPOFOL 100 MG: 10 INJECTION, EMULSION INTRAVENOUS at 04:03

## 2019-03-18 RX ADMIN — TRANEXAMIC ACID 1000 MG: 100 INJECTION, SOLUTION INTRAVENOUS at 05:03

## 2019-03-18 RX ADMIN — ROCURONIUM BROMIDE 15 MG: 10 INJECTION, SOLUTION INTRAVENOUS at 04:03

## 2019-03-18 RX ADMIN — GLYCOPYRROLATE 0.6 MG: 0.2 INJECTION, SOLUTION INTRAMUSCULAR; INTRAVENOUS at 07:03

## 2019-03-18 NOTE — ANESTHESIA PREPROCEDURE EVALUATION
03/18/2019  Darrel Bennett is a 87 y.o., female.    Anesthesia Evaluation         Review of Systems  Anesthesia Hx:  No previous Anesthesia   Social:  Non-Smoker    Hematology/Oncology:  Hematology Normal   Oncology Normal     EENT/Dental:EENT/Dental Normal   Cardiovascular:   Hypertension CAD      Pulmonary:  Pulmonary Normal    Renal/:   Chronic Renal Disease    Hepatic/GI:  Hepatic/GI Normal    Musculoskeletal:   Arthritis     Neurological:   Neuromuscular Disease,    Endocrine:  Endocrine Normal    Dermatological:  Skin Normal    Psych:   Psychiatric History          Physical Exam  General:  Well nourished    Airway/Jaw/Neck:  Airway Findings: Mallampati: II TM Distance: < 4 cm      Dental:  DENTAL FINDINGS: Normal   Chest/Lungs:  Chest/Lungs Clear    Heart/Vascular:  Heart Findings: Normal       Mental Status:  Mental Status Findings:  Cooperative, Alert and Oriented         Anesthesia Plan  Type of Anesthesia, risks & benefits discussed:  Anesthesia Type:  general  Patient's Preference:   Intra-op Monitoring Plan: standard ASA monitors  Intra-op Monitoring Plan Comments:   Post Op Pain Control Plan: multimodal analgesia, IV/PO Opioids PRN and per primary service following discharge from PACU  Post Op Pain Control Plan Comments:   Induction:    Beta Blocker:  Patient is not currently on a Beta-Blocker (No further documentation required).       Informed Consent: Patient understands risks and agrees with Anesthesia plan.  Questions answered. Anesthesia consent signed with patient.  ASA Score: 3     Day of Surgery Review of History & Physical:    H&P update referred to the provider.  H&P completed by Anesthesiologist.   Anesthesia Plan Notes: npo        Ready For Surgery From Anesthesia Perspective.

## 2019-03-18 NOTE — NURSING
patient arrived from ED via stretcher AAOx3, daughter at bedside. Transferred to bed, skin assessed, intact no pressure injuries observed. +2 edema to bilateral lower extremeties. Patient to CT via bed.

## 2019-03-18 NOTE — NURSING
Pt crying out, getting a little anxious, asking to sit on the side of the bed, reinforced that she must stay in the bed, alarm set.  Daughter at the bedside.  Medicated for pain per MD order

## 2019-03-18 NOTE — CONSULTS
Chief Complaint: LEFT leg pain    History of Present Illness:  Darrel Bennett is a very pleasant 87 y.o. female who presents with left leg pain after sustaining an unwitness fall . The pain is worse with movemtn and relieved with rest.  Patient denies any additional injuries but she has dementia.  She has a hx of a left TKA and a left DEVON.    Review of Systems:    Noncontributory as se has dementia      Past Medical History:   Diagnosis Date    Allergy     Arthritis     CAD (coronary artery disease) 11/26/2013    Chronic pain 5/1/2013    DDD (degenerative disc disease), thoracic 10/4/2012    Depression 5/1/2013    Encounter for blood transfusion     Hammer toe 5/1/2013    Idiopathic thrombocytopenic purpura (ITP) 1/28/2013    Osteoarthritis 10/4/2012    Osteoporosis, postmenopausal 11/26/2013    Positive NAZANIN (antinuclear antibody) 1/28/2013    SLE (systemic lupus erythematosus) 5/1/2013    Spondylolysis of lumbar region 10/4/2012       Past Surgical History:   Past Surgical History:   Procedure Laterality Date    ARTHROPLASTY-HIP Left 4/16/2015    [facility]    ARTHROPLASTY-HIP-AGATA (ENDOPROSTHESIS) Left 3/24/2015    Performed by Joao Hilliard III, MD at [facility] OR    BIOPSY Left 4/16/2015    [facility] at [facility] OR    CYSTOSCOPY with bilateral  RETROGRADE and bilateral URETEROSCOPY with cystolithopaxy 2cm,  RIGHT holmium laser litho, bilateral STENT, rosenthal  PLACEMENT Bilateral 6/9/2015    Performed by [facility]keon [facility] at [facility] OR    CYSTOSCOPY WITH RETROGRADE PYELOGRAM Bilateral 4/18/2015    Performed by [facility] at [facility] OR    HYSTERECTOMY      INCISION AND DRAINAGE (I & D) Left 4/16/2015    [facility] by [facility] at [facility] OR    PLACEMENT ROSENTHAL CATHETER N/A 4/18/2015    [facility]formed by [facility] at [facility] OR    PLACEMENT-STENT URETERAL Right 4/18/2015    Performed by [facility]on  [facility] at [facility] OR    REVISION-ARTHROPLASTY-HIP AGATA TO TOTAL Left 4/16/2015    Performed by [facility] at [facility] OR    right hip athroplasty Right     SPLENECTOMY, TOTAL      TOTAL KNEE ARTHROPLASTY Left        Social History:  negative tobacco abuse    Allergies:  Review of patient's allergies indicates:   Allergen Reactions    Penicillins Rash       Medications:  Current Facility-Administered Medications   Medication Dose Route Frequency Provider Last Rate Last Dose    0.9%  NaCl infusion   Intravenous Continuous Gisela Tyler MD 75 mL/hr at 03/18/19 0831      acetaminophen tablet 650 mg  650 mg Oral Q8H PRN Tracy Hathaway MD        amLODIPine tablet 5 mg  5 mg Oral Daily Andrew Benitez MD   5 mg at 03/18/19 0832    morphine injection 2 mg  2 mg Intravenous Q4H PRN Andrew Benitez MD   2 mg at 03/18/19 1349    ondansetron injection 8 mg  8 mg Intravenous Q8H PRN Andrew Benitez MD        oxyCODONE-acetaminophen 5-325 mg per tablet 1 tablet  1 tablet Oral Q6H PRN Andrew Benitez MD   1 tablet at 03/18/19 0836    pantoprazole injection 40 mg  40 mg Intravenous Daily Tracy Hathaway MD   40 mg at 03/18/19 0832    sodium chloride 0.9% flush 5 mL  5 mL Intravenous PRN Tracy Hathaway MD           Physical Exam:   General:  Well developed and well nourished age appropriate female in no acute distress  Cardiovascular: regular rate and rhythm  Respiratory:  Well developed well nourished, no wheezing  Abdomen: soft, non-tender, non-distended  Musculoskeletal: grimace with palpation of left distal thigh  Mild swelling and no ecchymosis  Grossly moving left leg    Neuro: grossly moving left leg    Imaging:  Imaging revealed: periprosthetic left distal femur fracture with adequate bone stock for fixation and s/p ipsilateral left DEVON with no signs of looseing nor fracture    Diagnosis:  88 y/o female with dementia left periprosthetic distal femur fracture and ipsilateral  DEVON    Plan:   -We discussed both surgical and non surgical options with the daught.  Patient's daughter would like to proceed with surgical intervention.  Patient understands the inherent risks and benefits and would like to proceed with the following surgical intervention on 3/18/2019  .  All consents were signed.    Planned Surgical Intervention:  LEFT ORIF of distal femur fracture  NPO  To OR today

## 2019-03-18 NOTE — H&P
[facility] Medicine  History & Physical    Patient Name: Darrel Bennett  MRN: [id number]  Admission Date: 03/18/2019  Attending Physician: Andrew Benitez MD, MPH      PCP:     Primary Doctor No    CC:     Chief Complaint   Patient presents with    Fall     From [facility] after trying to transfer from her bed to wheelchair. Fall was unwitnessed. Pt c/o lower back and left knee pain       HISTORY OF PRESENT ILLNESS:     Darrel Bennett is a 87 y.o. female that (in part)  has a past medical history of Allergy, Arthritis, CAD (coronary artery disease), Chronic pain, DDD (degenerative disc disease), thoracic, Depression, Encounter for blood transfusion, Hammer toe, Idiopathic thrombocytopenic purpura (ITP), Osteoarthritis, Osteoporosis, postmenopausal, Positive NAZANIN (antinuclear antibody), SLE (systemic lupus erythematosus), and Spondylolysis of lumbar region.  has a past surgical history that includes Hysterectomy; Splenectomy, total; right hip athroplasty (Right); and Total knee arthroplasty (Left). Presents to [facility] Emergency Department complaining of left knee pain status post fall as described below in detail.     Description of symptoms    Location:  Left knee  Onset:  Acute s/p fall  Character:  Sharp aching pain  Frequency:  One episode  Duration:  constant, ataxia and pain w/ weight bearing  Radiation:  From left knee to proximal thigh  Exacerbating factors:  Worsened w/ weight bearing  Relieving factors:  Some relief with pain medications         In the emergency department xray showed left acute distal femur periprosthetic fracture.  This was confirmed with CT of lower extremity. Ortho notified. Planning for ORIF on Monday.    Hospital medicine has been asked to admit for further evaluation and treatment.   Associated Symptoms:  No paresthesias      REVIEW OF SYSTEMS:     History is otherwise limited due to dementia.    PAST MEDICAL / SURGICAL  HISTORY:     Past Medical History:   Diagnosis Date    Allergy     Arthritis     CAD (coronary artery disease) 11/26/2013    Chronic pain 5/1/2013    DDD (degenerative disc disease), thoracic 10/4/2012    Depression 5/1/2013    Encounter for blood transfusion     Hammer toe 5/1/2013    Idiopathic thrombocytopenic purpura (ITP) 1/28/2013    Osteoarthritis 10/4/2012    Osteoporosis, postmenopausal 11/26/2013    Positive NAZANIN (antinuclear antibody) 1/28/2013    SLE (systemic lupus erythematosus) 5/1/2013    Spondylolysis of lumbar region 10/4/2012     Past Surgical History:   Procedure Laterality Date    ARTHROPLASTY-HIP Left 4/16/2015    Performed by [facility]mukh [facility]bar MD at [facility] OR    ARTHROPLASTY-HIP-AGATA (ENDOPROSTHESIS) Left 3/24/2015    Performed by Joao Hilliard III, MD at [facility] OR    BIOPSY Left 4/16/2015    Performed by [facility]mukh Dunbar MD at [facility] OR    CYSTOSCOPY with bilateral  RETROGRADE and bilateral URETEROSCOPY with cystolithopaxy 2cm,  RIGHT holmium laser litho, bilateral STENT, rosenthal  PLACEMENT Bilateral 6/9/2015    Performed by Maik Mckeon MD at [facility] OR    CYSTOSCOPY WITH RETROGRADE PYELOGRAM Bilateral 4/18/2015    Performed by Maik Mckeon MD at [facility] OR    HYSTERECTOMY      INCISION AND DRAINAGE (I & D) Left 4/16/2015    Performed by [facility]mukh Dunbar MD at [facility] OR    PLACEMENT ROSENTHAL CATHETER N/A 4/18/2015    Performed by Maik Mckeon MD at [facility] OR    PLACEMENT-STENT URETERAL Right 4/18/2015    Performed by Maik Mckeon MD at [facility] OR    REVISION-ARTHROPLASTY-HIP AGATA TO TOTAL Left 4/16/2015    Performed by [facility]mukh Dunbar MD at [facility] OR    right hip athroplasty Right     SPLENECTOMY, TOTAL      TOTAL KNEE ARTHROPLASTY Left          FAMILY HISTORY:     Family History   Problem Relation Age of Onset    Arthritis Mother     Cancer Sister     Cancer Brother          SOCIAL HISTORY:     Social History     Socioeconomic History     Marital status:      Spouse name: None    Number of children: None    Years of education: None    Highest education level: None   Social Needs    Financial resource strain: None    Food insecurity - worry: None    Food insecurity - inability: None    Transportation needs - medical: None    Transportation needs - non-medical: None   Occupational History    None   Tobacco Use    Smoking status: Former Smoker     Start date: 1950     Last attempt to quit: 1964     Years since quittin.0    Smokeless tobacco: Never Used   Substance and Sexual Activity    Alcohol use: No    Drug use: No    Sexual activity: None   Other Topics Concern    None   Social History Narrative    None         ALLERGIES:       Review of patient's allergies indicates:   Allergen Reactions    Penicillins Rash           HOME MEDICATIONS:     Prior to Admission medications    Medication Sig Start Date End Date Taking? Authorizing Provider   acetaminophen (TYLENOL) 325 MG tablet Take 2 tablets (650 mg total) by mouth every 6 (six) hours as needed. 3/30/15  Yes Oscar Barber MD   allopurinol (ZYLOPRIM) 100 MG tablet Take 1 tablet (100 mg total) by mouth once daily. 10/27/15  Yes Maik Mckeon MD   amlodipine (NORVASC) 5 MG tablet Take 5 mg by mouth once daily.   Yes Historical Provider, MD   docusate sodium (COLACE) 100 MG capsule Take 1 capsule (100 mg total) by mouth every 12 (twelve) hours. 10/27/15  Yes Maik Mckeon MD   hydroxychloroquine (PLAQUENIL) 200 mg tablet Take 1 tablet (200 mg total) by mouth once daily. 10/3/14  Yes Steve Adorno MD   lubiprostone (AMITIZA) 24 MCG Cap Take 1 capsule (24 mcg total) by mouth 2 (two) times daily with meals. 10/27/15  Yes Maik Mckeon MD   phenazopyridine (PYRIDIUM) 200 MG tablet  6/29/15  Yes Historical Provider, MD   polyethylene glycol (GLYCOLAX) 17 gram PwPk Take 17 g by mouth once daily. 10/27/15  Yes Maik Mckeon MD  "  tramadol (ULTRAM) 50 mg tablet Take 50 mg by mouth every 6 (six) hours as needed for Pain.   Yes Historical Provider, MD   XARELTO 10 mg Tab  7/12/15  Yes Historical Provider, MD          HOSPITAL MEDICATIONS:     Scheduled Meds:    sodium chloride 0.9%  1,000 mL Intravenous ED 1 Time    pantoprazole  40 mg Intravenous Daily     Continuous Infusions:   PRN Meds: acetaminophen, morphine, ondansetron, oxyCODONE-acetaminophen, sodium chloride 0.9%      PHYSICAL EXAM:     Wt Readings from Last 1 Encounters:   03/18/19 0022 61.1 kg (134 lb 11.2 oz)     Body mass index is 19.89 kg/m².  Vitals:    03/17/19 1853 03/17/19 1954 03/18/19 0022 03/18/19 0053   BP: 138/86 128/85 117/69    BP Location: Right arm Right arm Right arm    Patient Position: Lying Lying Lying    Pulse: 83 88 (!) 113 101   Resp: 18 18 18    Temp: 97.8 °F (36.6 °C) 98.4 °F (36.9 °C) 99.9 °F (37.7 °C)    TempSrc: Oral Oral Oral    SpO2: 97%      Weight:   61.1 kg (134 lb 11.2 oz)    Height:   5' 9" (1.753 m)           -- General appearance: well developed. appears stated age   -- Head: normocephalic, atraumatic   -- Eyes: conjunctivae clear. Extraocular muscles intact  -- Nose: Nares normal. Septum midline.   -- Mouth/Throat: lips, mucosa, and tongue normal. no throat erythema.   -- Neck: supple, symmetrical, trachea midline, no JVD and thyroid not grossly enlarged, appears symmetric  -- Lungs: clear to auscultation bilaterally. normal respiratory effort. No use of accessory muscles.   -- Chest wall: no tenderness. equal bilateral chest rise   -- Heart:  Rapid rate and regular rhythm. S1, S2 normal.  no click, rub or gallop   -- Abdomen: soft, non-tender, non-distended, non-tympanic; bowel sounds normal; no masses  -- Extremities:  Tenderness to palpation over left distal fever.  No deformities appreciated  -- Pulses: 2+ and symmetric   -- Skin: color normal, texture normal, turgor normal. No rashes or lesions.   -- Neurologic:  Pleasantly demented.  " Confused.  Globally decreased muscle strength and tone. No focal numbness or weakness. CNII-XII intact. Rayna coma scale: eyes open spontaneously-4, oriented & converses-5, obeys commands-6.      LABORATORY STUDIES:     Recent Results (from the past 36 hour(s))   CBC auto differential    Collection Time: 03/17/19  6:47 PM   Result Value Ref Range    WBC 8.39 3.90 - 12.70 K/uL    RBC 4.07 4.00 - 5.40 M/uL    Hemoglobin 12.5 12.0 - 16.0 g/dL    Hematocrit 39.1 37.0 - 48.5 %    MCV 96 82 - 98 fL    MCH 30.7 27.0 - 31.0 pg    MCHC 32.0 32.0 - 36.0 g/dL    RDW 15.4 (H) 11.5 - 14.5 %    Platelets 305 150 - 350 K/uL    MPV 9.7 9.2 - 12.9 fL    Gran # (ANC) 5.4 1.8 - 7.7 K/uL    Lymph # 2.3 1.0 - 4.8 K/uL    Mono # 0.6 0.3 - 1.0 K/uL    Eos # 0.1 0.0 - 0.5 K/uL    Baso # 0.05 0.00 - 0.20 K/uL    Gran% 63.8 38.0 - 73.0 %    Lymph% 26.9 18.0 - 48.0 %    Mono% 7.2 4.0 - 15.0 %    Eosinophil% 1.5 0.0 - 8.0 %    Basophil% 0.6 0.0 - 1.9 %    Differential Method Automated    Comprehensive metabolic panel    Collection Time: 03/17/19  6:47 PM   Result Value Ref Range    Sodium 143 136 - 145 mmol/L    Potassium 4.5 3.5 - 5.1 mmol/L    Chloride 107 95 - 110 mmol/L    CO2 26 23 - 29 mmol/L    Glucose 94 70 - 110 mg/dL    BUN, Bld 26 (H) 8 - 23 mg/dL    Creatinine 0.9 0.5 - 1.4 mg/dL    Calcium 9.7 8.7 - 10.5 mg/dL    Total Protein 8.0 6.0 - 8.4 g/dL    Albumin 3.3 (L) 3.5 - 5.2 g/dL    Total Bilirubin 0.5 0.1 - 1.0 mg/dL    Alkaline Phosphatase 161 (H) 55 - 135 U/L    AST 19 10 - 40 U/L    ALT 8 (L) 10 - 44 U/L    Anion Gap 10 8 - 16 mmol/L    eGFR if African American >60 >60 mL/min/1.73 m^2    eGFR if non African American 58 (A) >60 mL/min/1.73 m^2   Protime-INR    Collection Time: 03/17/19  6:47 PM   Result Value Ref Range    Prothrombin Time 11.4 9.0 - 12.5 sec    INR 1.1 0.8 - 1.2       Lab Results   Component Value Date    INR 1.1 03/17/2019    INR 1.2 03/23/2015    INR 1.2 07/19/2014     No results found for: HGBA1C  No  results for input(s): POCTGLUCOSE in the last 72 hours.            IMAGING:     Imaging Results          CT Thigh Without Contrast Left (Final result)  Result time 03/17/19 22:32:51    Final result by Keira Taveras MD (03/17/19 22:32:51)                 Impression:      Acute distal femur periprosthetic fracture.      Electronically signed by: Keira Taveras MD  Date:    03/17/2019  Time:    22:32             Narrative:    EXAMINATION:  CT THIGH WITHOUT CONTRAST LEFT    CLINICAL HISTORY:  Fracture, femur;please inclide entire femur;    TECHNIQUE:  CT of the left thigh was performed without the use of IV contrast.  3D reformats were constructed.    COMPARISON:  Left femur radiopaque.    FINDINGS:  Evaluation limited by patient positioning and metallic artifact.  Postsurgical changes left total hip arthroplasty and left total knee arthroplasty are seen.  There is redemonstration acute distal femur periprosthetic fracture with displacement and mild comminution.    Additional postsurgical changes of left total hip arthroplasty are seen.                               X-Ray Chest AP Portable (Final result)  Result time 03/17/19 19:40:48    Final result by Keira Taveras MD (03/17/19 19:40:48)                 Impression:      As above.      Electronically signed by: Keira Taveras MD  Date:    03/17/2019  Time:    19:40             Narrative:    EXAMINATION:  XR CHEST AP PORTABLE    CLINICAL HISTORY:  Encounter for other preprocedural examination    TECHNIQUE:  Single frontal view of the chest was performed.    COMPARISON:  01/16/2019.    FINDINGS:  Patient is rotated toward the right.  There is stable enlargement of the cardiomediastinal silhouette with stable aortic ectasia or tortuosity.  No evidence of new focal consolidative process, pneumothorax, or large effusion.  No acute osseous abnormality identified.                               CT Head Without Contrast (Final result)  Result time 03/17/19 17:37:37     Final result by Keira Taveras MD (03/17/19 17:37:37)                 Impression:      No acute intracranial abnormalities identified.      Electronically signed by: Keira Taveras MD  Date:    03/17/2019  Time:    17:37             Narrative:    EXAMINATION:  CT HEAD WITHOUT CONTRAST    CLINICAL HISTORY:  Confusion/delirium, altered LOC, unexplained;unwitnessed fall, hx dementia;    TECHNIQUE:  Low dose axial images were obtained through the head.  Coronal and sagittal reformations were also performed. Contrast was not administered.    COMPARISON:  CT head from 01/16/2019.    FINDINGS:  There is generalized cerebral volume loss and chronic microvascular ischemic disease.  No evidence acute/recent major vascular distribution cerebral infarction, intraparenchymal hemorrhage, or intra-axial space occupying lesion. The ventricular system is normal in size and configuration with no evidence of hydrocephalus. No effacement of the skull-base cisterns. No abnormal extra-axial fluid collections or blood products. Visualized paranasal sinuses and mastoid air cells are clear. The calvarium shows no significant abnormality.                               X-Ray Knee 1 or 2 View Left (Final result)     Abnormal  Result time 03/17/19 18:01:10    Final result by Memo Mohr MD (03/17/19 18:01:10)                 Impression:      Left knee total arthroplasty with distal left femoral acute fracture near the base of the femoral condylar prosthetic component, as above.    Left hip total arthroplasty without evidence of complication.  No additional displaced fractures or dislocation seen.    This report was flagged in Epic as abnormal.      Electronically signed by: Memo Mohr MD  Date:    03/17/2019  Time:    18:01             Narrative:    EXAMINATION:  XR KNEE 1 OR 2 VIEW LEFT; XR FEMUR 2 VIEW LEFT    CLINICAL HISTORY:  Unspecified fall, initial encounter; Pain in left leg    TECHNIQUE:  AP and lateral views of the left  femur and also left knee    COMPARISON:  CT abdomen and pelvis 04/17/2015, left hip series 04/16/2015, left femur series 03/16/2015    FINDINGS:  Study is somewhat limited by suboptimal positioning with patient rotation and overlapping of osseous structures, related to patient discomfort and fractures described below.    Partially imaged right hip total arthroplasty noted.  Left hip total arthroplasty in place and appears intact.  Left knee total arthroplasty in place.  There is an acute fracture through the distal femoral metaphysis at the base of the distal femoral condylar prosthetic component with apex angulation towards the ventral and medial aspect, including mild impaction.  No dislocation.  Remainder of the femur and imaged proximal tibia and fibula are otherwise intact.  No subcutaneous emphysema or radiodense retained foreign body.                               X-Ray Femur Ap/Lat Left (Final result)     Abnormal  Result time 03/17/19 18:01:10    Final result by Memo Mohr MD (03/17/19 18:01:10)                 Impression:      Left knee total arthroplasty with distal left femoral acute fracture near the base of the femoral condylar prosthetic component, as above.    Left hip total arthroplasty without evidence of complication.  No additional displaced fractures or dislocation seen.    This report was flagged in Epic as abnormal.      Electronically signed by: Memo Mohr MD  Date:    03/17/2019  Time:    18:01             Narrative:    EXAMINATION:  XR KNEE 1 OR 2 VIEW LEFT; XR FEMUR 2 VIEW LEFT    CLINICAL HISTORY:  Unspecified fall, initial encounter; Pain in left leg    TECHNIQUE:  AP and lateral views of the left femur and also left knee    COMPARISON:  CT abdomen and pelvis 04/17/2015, left hip series 04/16/2015, left femur series 03/16/2015    FINDINGS:  Study is somewhat limited by suboptimal positioning with patient rotation and overlapping of osseous structures, related to patient discomfort  and fractures described below.    Partially imaged right hip total arthroplasty noted.  Left hip total arthroplasty in place and appears intact.  Left knee total arthroplasty in place.  There is an acute fracture through the distal femoral metaphysis at the base of the distal femoral condylar prosthetic component with apex angulation towards the ventral and medial aspect, including mild impaction.  No dislocation.  Remainder of the femur and imaged proximal tibia and fibula are otherwise intact.  No subcutaneous emphysema or radiodense retained foreign body.                               X-Ray Lumbar Spine Ap And Lateral (Final result)  Result time 03/17/19 17:52:03    Final result by Memo Mohr MD (03/17/19 17:52:03)                 Impression:      No acute displaced fracture-dislocation definitively seen.    Generalized osteopenia and lumbar spondylosis with grossly stable chronic findings as above.      Electronically signed by: Memo Mohr MD  Date:    03/17/2019  Time:    17:52             Narrative:    EXAMINATION:  XR LUMBAR SPINE AP AND LATERAL    CLINICAL HISTORY:  Low back pain, minor trauma;    TECHNIQUE:  AP, lateral and spot images were performed of the lumbar spine.    COMPARISON:  Chest radiograph 05/25/2015 and CT abdomen and pelvis 04/17/2015    FINDINGS:  Generalized osteopenia.  Partially imaged bilateral hip total arthroplasties noted.    Moderate to severe anterior wedge deformity s/p remote vertebroplasty at T10 vertebral body grossly unchanged.  Grossly unchanged mild anterior wedge deformity of T11, T12 and L1 vertebral bodies.  No convincing evidence of acute vertebral compression deformity.  Stable degenerative related grade 1 anterolisthesis L5 on S1.  Levocurvature of the thoracolumbar spine grossly similar to prior.  Lumbar lordosis is maintained.  No displaced fracture or dislocation seen.  No subcutaneous emphysema or radiodense retained foreign body.  Multilevel facet arthrosis  most prominent at the lower lumbosacral spine.  Multilevel degenerative disc disease with endplate changes and small marginal osteophytes noted.  Scattered advanced atherosclerotic vascular calcifications of the aorta and its branch vessels noted.                                  CONSULTS:     IP CONSULT TO ORTHOPEDIC SURGERY  IP CONSULT TO SOCIAL WORK/CASE MANAGEMENT       ASSESSMENT & PLAN:     Primary Diagnosis:  Closed fracture of left distal femur    Active Hospital Problems    Diagnosis  POA    *Closed fracture of left distal femur [S72.402A]  Yes     Priority: 1 - High    Chronic kidney disease, stage III (moderate) [N18.3]  Yes    Dementia [F03.90]  Yes    CAD (coronary artery disease) [I25.10]  Yes      Resolved Hospital Problems   No resolved problems to display.         Acute distal femur periprosthetic fracture  · Status post fall.  · Orthopedic surgery consulted possible ORIF today  · Pain control  · NPO  · Type and screen    Cardiovascular Risk Assessment:  · Non-emergent surgery.  · No active cardiac problems (such as unstable angina, decompensated heart failure, significant uncontrolled arrhythmias or severe valvular disease).  · Intermediate risk surgery.  · Functional Status: unable to climb a flight of stairs at baseline  · The revised cardiac risk index is 1.      (1 pt Each: Ischemic Heart Disease, Cerebrovascular Disease,CHF, DM, Creatinine > 2 )          Patient is a moderate to high risk candidate for a moderate risk surgery given her age, CAD, and numerous chronic medical problems. However, there appears to be no acute exacerbation of any one particular condition at this time.  Although the patient is at increased risk for a perioperative complication due to these chronic conditions, there is  high morbidity and mortality associated with hip fractures not undergoing surgical repair in this particular elderly patient population.  I would recommend proceeding with surgery.  Hospital  Medicine service will follow along and provide medical optimization during the perioperative period.       Recommendation:  1. Proceed to Surgery.  2.  Hold Xarelto until safe to resume      =====================================================================    Coronary artery disease  · No evidence of acute coronary syndrome at this time  · Maintain adequate blood pressure control  · Heart healthy diet  · Aspirin held before surgery  · Statin      Chronic Kidney Disease  · Renal dose medications  · Avoid nephrotoxic agents  · Maintain euvolemic state      Dementia  No acute issues.  Supportive care.      VTE Risk Mitigation (From admission, onward)        Ordered     IP VTE HIGH RISK PATIENT  Once      03/17/19 2001     Place LUCY hose  Until discontinued      03/17/19 2001            Adult PRN medications available   DVT prophylaxis given       DISPOSITION:     Will admit to the Hospital Medicine service for further evaluation and treatment.    Chart reviewed and updated where applicable.    High Risk Conditions:  Patient has a condition that poses threat to life and bodily function:  Closed left distal femur fracture.       ===============================================================    Andrew Benitez MD, MPH  Department of Hospital Medicine   [facility]  [phone] pg  (7pm - 6am)          This note is dictated using [employer] voice recognition software.  There are word recognition mistakes that are occasionally missed on review.

## 2019-03-18 NOTE — PLAN OF CARE
Problem: Adult Inpatient Plan of Care  Goal: Plan of Care Review  Outcome: Ongoing (interventions implemented as appropriate)   03/18/19 0642   Plan of Care Review   Plan of Care Reviewed With patient   No falls, trauma or injury this shift. Skin is intact/ no breakdown observed. Patient given surgical bath for possible surgery today. Daughter at bedside. Bed alarm in place. Continue with plan of care and continue to monitor patient.

## 2019-03-19 PROBLEM — I95.9 HYPOTENSION: Status: ACTIVE | Noted: 2019-03-19

## 2019-03-19 LAB
ALBUMIN SERPL BCP-MCNC: 2.2 G/DL
ALP SERPL-CCNC: 97 U/L
ALT SERPL W/O P-5'-P-CCNC: 8 U/L
ANION GAP SERPL CALC-SCNC: 6 MMOL/L
AST SERPL-CCNC: 18 U/L
BASOPHILS # BLD AUTO: 0.02 K/UL
BASOPHILS NFR BLD: 0.2 %
BILIRUB SERPL-MCNC: 1.3 MG/DL
BUN SERPL-MCNC: 22 MG/DL
CALCIUM SERPL-MCNC: 7.7 MG/DL
CHLORIDE SERPL-SCNC: 113 MMOL/L
CO2 SERPL-SCNC: 22 MMOL/L
CREAT SERPL-MCNC: 0.8 MG/DL
DIFFERENTIAL METHOD: ABNORMAL
EOSINOPHIL # BLD AUTO: 0 K/UL
EOSINOPHIL NFR BLD: 0 %
ERYTHROCYTE [DISTWIDTH] IN BLOOD BY AUTOMATED COUNT: 15.8 %
EST. GFR  (AFRICAN AMERICAN): >60 ML/MIN/1.73 M^2
EST. GFR  (NON AFRICAN AMERICAN): >60 ML/MIN/1.73 M^2
GLUCOSE SERPL-MCNC: 114 MG/DL
HCT VFR BLD AUTO: 28.3 %
HGB BLD-MCNC: 9 G/DL
LYMPHOCYTES # BLD AUTO: 1.1 K/UL
LYMPHOCYTES NFR BLD: 11 %
MCH RBC QN AUTO: 30.9 PG
MCHC RBC AUTO-ENTMCNC: 31.8 G/DL
MCV RBC AUTO: 97 FL
MONOCYTES # BLD AUTO: 0.8 K/UL
MONOCYTES NFR BLD: 8.4 %
NEUTROPHILS # BLD AUTO: 8.1 K/UL
NEUTROPHILS NFR BLD: 80.4 %
PLATELET # BLD AUTO: 217 K/UL
PMV BLD AUTO: 10.1 FL
POTASSIUM SERPL-SCNC: 4.6 MMOL/L
PROT SERPL-MCNC: 5.4 G/DL
RBC # BLD AUTO: 2.91 M/UL
SODIUM SERPL-SCNC: 141 MMOL/L
WBC # BLD AUTO: 10.05 K/UL

## 2019-03-19 PROCEDURE — 36415 COLL VENOUS BLD VENIPUNCTURE: CPT | Mod: HCNC

## 2019-03-19 PROCEDURE — C9113 INJ PANTOPRAZOLE SODIUM, VIA: HCPCS | Mod: HCNC | Performed by: EMERGENCY MEDICINE

## 2019-03-19 PROCEDURE — 25000003 PHARM REV CODE 250: Mod: HCNC | Performed by: EMERGENCY MEDICINE

## 2019-03-19 PROCEDURE — 85025 COMPLETE CBC W/AUTO DIFF WBC: CPT | Mod: HCNC

## 2019-03-19 PROCEDURE — 25000003 PHARM REV CODE 250: Mod: HCNC | Performed by: ORTHOPAEDIC SURGERY

## 2019-03-19 PROCEDURE — 63600175 PHARM REV CODE 636 W HCPCS: Mod: HCNC | Performed by: ORTHOPAEDIC SURGERY

## 2019-03-19 PROCEDURE — 80053 COMPREHEN METABOLIC PANEL: CPT | Mod: HCNC

## 2019-03-19 PROCEDURE — 63600175 PHARM REV CODE 636 W HCPCS: Mod: HCNC | Performed by: EMERGENCY MEDICINE

## 2019-03-19 PROCEDURE — 11000001 HC ACUTE MED/SURG PRIVATE ROOM: Mod: HCNC

## 2019-03-19 PROCEDURE — 25000003 PHARM REV CODE 250: Mod: HCNC | Performed by: INTERNAL MEDICINE

## 2019-03-19 PROCEDURE — 63600175 PHARM REV CODE 636 W HCPCS: Mod: HCNC | Performed by: INTERNAL MEDICINE

## 2019-03-19 PROCEDURE — 97161 PT EVAL LOW COMPLEX 20 MIN: CPT | Mod: HCNC

## 2019-03-19 PROCEDURE — 25000003 PHARM REV CODE 250: Mod: HCNC | Performed by: HOSPITALIST

## 2019-03-19 PROCEDURE — 97166 OT EVAL MOD COMPLEX 45 MIN: CPT | Mod: HCNC

## 2019-03-19 RX ORDER — ACETAMINOPHEN 10 MG/ML
1000 INJECTION, SOLUTION INTRAVENOUS ONCE
Status: COMPLETED | OUTPATIENT
Start: 2019-03-19 | End: 2019-03-19

## 2019-03-19 RX ORDER — SODIUM CHLORIDE 9 MG/ML
INJECTION, SOLUTION INTRAVENOUS CONTINUOUS
Status: DISCONTINUED | OUTPATIENT
Start: 2019-03-19 | End: 2019-03-20

## 2019-03-19 RX ORDER — HYDROCODONE BITARTRATE AND ACETAMINOPHEN 5; 325 MG/1; MG/1
1 TABLET ORAL ONCE
Status: COMPLETED | OUTPATIENT
Start: 2019-03-19 | End: 2019-03-19

## 2019-03-19 RX ORDER — AMOXICILLIN 250 MG
1 CAPSULE ORAL DAILY PRN
Status: DISCONTINUED | OUTPATIENT
Start: 2019-03-19 | End: 2019-03-22 | Stop reason: HOSPADM

## 2019-03-19 RX ORDER — FLUOXETINE HYDROCHLORIDE 20 MG/1
40 CAPSULE ORAL DAILY
Status: DISCONTINUED | OUTPATIENT
Start: 2019-03-20 | End: 2019-03-22 | Stop reason: HOSPADM

## 2019-03-19 RX ORDER — ALLOPURINOL 100 MG/1
100 TABLET ORAL DAILY
Status: DISCONTINUED | OUTPATIENT
Start: 2019-03-20 | End: 2019-03-22 | Stop reason: HOSPADM

## 2019-03-19 RX ORDER — POLYETHYLENE GLYCOL 3350 17 G/17G
17 POWDER, FOR SOLUTION ORAL DAILY
Status: DISCONTINUED | OUTPATIENT
Start: 2019-03-20 | End: 2019-03-22 | Stop reason: HOSPADM

## 2019-03-19 RX ORDER — HYDROXYCHLOROQUINE SULFATE 200 MG/1
200 TABLET, FILM COATED ORAL DAILY
Status: DISCONTINUED | OUTPATIENT
Start: 2019-03-20 | End: 2019-03-22 | Stop reason: HOSPADM

## 2019-03-19 RX ORDER — ACETAMINOPHEN 10 MG/ML
INJECTION, SOLUTION INTRAVENOUS
Status: DISCONTINUED | OUTPATIENT
Start: 2019-03-18 | End: 2019-03-19

## 2019-03-19 RX ORDER — ERGOCALCIFEROL 1.25 MG/1
50000 CAPSULE ORAL
Status: DISCONTINUED | OUTPATIENT
Start: 2019-03-22 | End: 2019-03-22 | Stop reason: HOSPADM

## 2019-03-19 RX ADMIN — MUPIROCIN 1 G: 20 OINTMENT TOPICAL at 10:03

## 2019-03-19 RX ADMIN — ACETAMINOPHEN 650 MG: 325 TABLET, FILM COATED ORAL at 06:03

## 2019-03-19 RX ADMIN — ACETAMINOPHEN 1000 MG: 10 INJECTION, SOLUTION INTRAVENOUS at 11:03

## 2019-03-19 RX ADMIN — HYDROCODONE BITARTRATE AND ACETAMINOPHEN 1 TABLET: 5; 325 TABLET ORAL at 06:03

## 2019-03-19 RX ADMIN — SODIUM CHLORIDE 1000 ML: 0.9 INJECTION, SOLUTION INTRAVENOUS at 02:03

## 2019-03-19 RX ADMIN — PANTOPRAZOLE SODIUM 40 MG: 40 INJECTION, POWDER, FOR SOLUTION INTRAVENOUS at 10:03

## 2019-03-19 RX ADMIN — VANCOMYCIN HYDROCHLORIDE 1000 MG: 1 INJECTION, POWDER, FOR SOLUTION INTRAVENOUS at 04:03

## 2019-03-19 RX ADMIN — ACETAMINOPHEN 650 MG: 325 TABLET, FILM COATED ORAL at 12:03

## 2019-03-19 RX ADMIN — SODIUM CHLORIDE: 0.9 INJECTION, SOLUTION INTRAVENOUS at 03:03

## 2019-03-19 RX ADMIN — SODIUM CHLORIDE: 0.9 INJECTION, SOLUTION INTRAVENOUS at 10:03

## 2019-03-19 RX ADMIN — ACETAMINOPHEN 1000 MG: 10 INJECTION, SOLUTION INTRAVENOUS at 03:03

## 2019-03-19 NOTE — PLAN OF CARE
Problem: Adult Inpatient Plan of Care  Goal: Plan of Care Review  Outcome: Ongoing (interventions implemented as appropriate)  S/p ORIF to left hip. Dressing and immobilizer noted. Oriented to self. Voiding well per villa cath. Skin integrity maintained. Hypotension experienced during shift, bolus administered. Iv fluids maintained. Free of falls. Call light within reach. Bed in low position. No issues during shift. Continue plan of care.

## 2019-03-19 NOTE — PLAN OF CARE
Problem: Physical Therapy Goal  Goal: Physical Therapy Goal  Goals to be met by: 3/26/2019     Patient will increase functional independence with mobility by performin. Supine to sit with Stand-by Assistance  2. Bed to chair transfer with Minimal Assistance using Slideboard  3. Wheelchair propulsion x100 feet with Stand-by Assistance using B UE's and  right lower extremity  4. Lower extremity exercise program x10 reps per handout, with assistance as needed    Outcome: Ongoing (interventions implemented as appropriate)  Initial PT evaluation performed.  Pt could benefit from skilled PT services 2-3x/wk in order to maximize function prior to D/C.  PT at next level of care recommended.

## 2019-03-19 NOTE — HPI
Darrel Bennett is a 87 y.o. female that (in part)  has a past medical history of Allergy, Arthritis, CAD (coronary artery disease), Chronic pain, DDD (degenerative disc disease), thoracic, Depression, Encounter for blood transfusion, Hammer toe, Idiopathic thrombocytopenic purpura (ITP), Osteoarthritis, Osteoporosis, postmenopausal, Positive NAZANIN (antinuclear antibody), SLE (systemic lupus erythematosus), and Spondylolysis of lumbar region.  has a past surgical history that includes Hysterectomy; Splenectomy, total; right hip athroplasty (Right); and Total knee arthroplasty (Left). Presents to [facility] Emergency [facility] complaining of left knee pain status post fall as described below in detail.                    Description of symptoms     Location:  Left knee  Onset:  Acute s/p fall  Character:  Sharp aching pain  Frequency:  One episode  Duration:  constant, ataxia and pain w/ weight bearing  Radiation:  From left knee to proximal thigh  Exacerbating factors:  Worsened w/ weight bearing  Relieving factors:  Some relief with pain medications            In the emergency department xray showed left acute distal femur periprosthetic fracture.  This was confirmed with CT of lower extremity. Ortho notified. Planning for ORIF on Monday.     Hospital medicine has been asked to admit for further evaluation and treatment.   Associated Symptoms:  No paresthesias

## 2019-03-19 NOTE — PLAN OF CARE
Problem: Occupational Therapy Goal  Goal: Occupational Therapy Goal  Goals to be met by: 3/31/19    Patient will increase functional independence with ADLs by performing:    Feeding with Set-up Assistance.  UE Dressing with Minimal Assistance.  Grooming while seated with Supervision.  Sitting at edge of bed x20 minutes with Supervision.  Rolling to Bilateral with Contact Guard Assistance.   Supine to sit with Contact Guard Assistance.  Toilet transfer to bedside commode with Minimal Assistance.  Upper extremity exercise program x7 reps per handout, with assistance as needed.    Outcome: Ongoing (interventions implemented as appropriate)  REC: Therapy at next level of care

## 2019-03-19 NOTE — CARE UPDATE
PT seen and examined by  this am. Closed distal left femur fracture. PLan for ORIF. Pt is bed bound and elected for surgery to assist with pain control.

## 2019-03-19 NOTE — BRIEF OP NOTE
Operative Note       Surgery Date: 3/18/2019     Surgeon(s) and Role:     * Geo Posada MD - Primary     * Shant Brink MD - Co-Surgeon    Pre-op Diagnosis:  Closed fracture of left distal femur [S72.402A]    Post-op Diagnosis:  same    Procedure(s) (LRB):  ORIF, FRACTURE, FEMUR DISTAL (Left) with cement augmentation    Anesthesia: Choice    Findings/Key Components:  C/w pre op dx    EBL: 400  IVF's 2 liters  1 unit prbc's  UOP: 150    Core Measure Documentation:  Were antibiotics extended? No  Was the patient administered a VTE Prophylaxis? No. Short procedure; low risk  Estimated Blood Loss: minimal           Specimens (From admission, onward)    None        Implants:   Implant Name Type Inv. Item Serial No.  Lot No. LRB No. Used   CEMENT BONE W/GENT SIMPLEX HV - [id number]  CEMENT BONE W/GENT SIMPLEX HV  REVENTIVE. [id number] Left 2   SCREW JAI VA МАРИЯ 5X60MM - [id number]  SCREW JAI VA МАРИЯ 5X60MM  [employer]  Left 3   SCREW JAI VA МАРИЯ 5X65MM - [id number]  SCREW JAI VA МАРИЯ 5X65MM  [employer]  Left 2   SCREW CORTEX 4.5 48M - [id number]  SCREW CORTEX 4.5 48M  [employer]  Left 1   8, left variable angle plate      Left 1   SCREW BONE VA LK 5.0X38MM - [id number]  SCREW BONE VA LK 5.0X38MM  [employer]  Left 2   SCREW STRDRV VA МАРИЯ 5X40MM - [id number]  SCREW STRDRV VA МАРИЯ 5X40MM  [employer]  Left 1       Complications: none           Disposition: PACU - hemodynamically stable.           Condition: Stable    Attestation:  I was present for the entire procedure.

## 2019-03-19 NOTE — SUBJECTIVE & OBJECTIVE
Interval History: Blood pressure low today. Patient is asymptomatic while sitting up in bed. Had received narcotics and additional IV fluids overnight. Complaining of pain.     Review of Systems   Respiratory: Negative.    Cardiovascular: Negative.    Gastrointestinal: Negative.    Musculoskeletal:        Knee pain     Objective:     Vital Signs (Most Recent):  Temp: 97.4 °F (36.3 °C) (03/19/19 1128)  Pulse: 78 (03/19/19 1128)  Resp: 18 (03/19/19 1128)  BP: (!) 89/51 (03/19/19 1128)  SpO2: (!) 93 % (03/19/19 0738) Vital Signs (24h Range):  Temp:  [97.4 °F (36.3 °C)-97.9 °F (36.6 °C)] 97.4 °F (36.3 °C)  Pulse:  [66-82] 78  Resp:  [15-28] 18  SpO2:  [75 %-98 %] 93 %  BP: ()/(43-73) 89/51     Weight: 61.1 kg (134 lb 11.2 oz)  Body mass index is 19.89 kg/m².    Intake/Output Summary (Last 24 hours) at 3/19/2019 1459  Last data filed at 3/19/2019 0900  Gross per 24 hour   Intake 3501.75 ml   Output 1350 ml   Net 2151.75 ml      Physical Exam   Constitutional: She appears well-developed. No distress.   Sitting up in bed alert and talkative  Non toxic appearing   Cardiovascular: Normal rate and regular rhythm.   Pulmonary/Chest: Effort normal and breath sounds normal.   Abdominal: Soft. Bowel sounds are normal. She exhibits no distension. There is no tenderness. There is no guarding.   Musculoskeletal:   Brace + dressing placed left leg   Neurological: She is alert.   Skin: Capillary refill takes less than 2 seconds. She is not diaphoretic.   Psychiatric: She has a normal mood and affect. Her behavior is normal. Judgment and thought content normal.   Nursing note and vitals reviewed.      Significant Labs: All pertinent labs within the past 24 hours have been reviewed.    Significant Imaging: I have reviewed all pertinent imaging results/findings within the past 24 hours.  I have reviewed and interpreted all pertinent imaging results/findings within the past 24 hours.

## 2019-03-19 NOTE — TRANSFER OF CARE
"Anesthesia Transfer of Care Note    Patient: Darrel Bennett    Procedure(s) Performed: Procedure(s) (LRB):  ORIF, FRACTURE, FEMUR DISTAL (Left)    Patient location: PACU    Anesthesia Type: general    Transport from OR: Transported from OR on room air with adequate spontaneous ventilation    Post pain: adequate analgesia    Post assessment: no apparent anesthetic complications and tolerated procedure well    Post vital signs: stable    Level of consciousness: awake and alert    Nausea/Vomiting: no nausea/vomiting    Complications: none    Transfer of care protocol was followed      Last vitals:   Visit Vitals  /65   Pulse 78   Temp 36.5 °C (97.7 °F) (Oral)   Resp 17   Ht 5' 9" (1.753 m)   Wt 61.1 kg (134 lb 11.2 oz)   SpO2 98%   Breastfeeding? No   BMI 19.89 kg/m²     "

## 2019-03-19 NOTE — ASSESSMENT & PLAN NOTE
Patient is asymptomatic, afebrile and without leukocytosis. Hgb stable post op after a transfusion  Continue IVFs, encourage PO intake and hold off on antihypertensives  Monitor closely

## 2019-03-19 NOTE — PROGRESS NOTES
[facility] Hospital Medicine  Progress Note    Patient Name: Darrel Bennett  MRN: [id number]  Patient Class: IP- Inpatient   Admission Date: 3/17/2019  Length of Stay: 2 days  Attending Physician: Zee Altman MD  Primary Care Provider: Primary Doctor No        Subjective:     Principal Problem:Closed fracture of left distal femur    HPI:  Darrel Bennett is a 87 y.o. female that (in part)  has a past medical history of Allergy, Arthritis, CAD (coronary artery disease), Chronic pain, DDD (degenerative disc disease), thoracic, Depression, Encounter for blood transfusion, Hammer toe, Idiopathic thrombocytopenic purpura (ITP), Osteoarthritis, Osteoporosis, postmenopausal, Positive NAZANIN (antinuclear antibody), SLE (systemic lupus erythematosus), and Spondylolysis of lumbar region.  has a past surgical history that includes Hysterectomy; Splenectomy, total; right hip athroplasty (Right); and Total knee arthroplasty (Left). Presents to [facility] Emergency Department complaining of left knee pain status post fall as described below in detail.                    Description of symptoms     Location:  Left knee  Onset:  Acute s/p fall  Character:  Sharp aching pain  Frequency:  One episode  Duration:  constant, ataxia and pain w/ weight bearing  Radiation:  From left knee to proximal thigh  Exacerbating factors:  Worsened w/ weight bearing  Relieving factors:  Some relief with pain medications            In the emergency department xray showed left acute distal femur periprosthetic fracture.  This was confirmed with CT of lower extremity. Ortho notified. Planning for ORIF on Monday.     Hospital medicine has been asked to admit for further evaluation and treatment.   Associated Symptoms:  No paresthesias        Hospital Course:  No notes on file    Interval History: Blood pressure low today. Patient is asymptomatic while sitting up in bed. Had received narcotics and additional  IV fluids overnight. Complaining of pain.     Review of Systems   Respiratory: Negative.    Cardiovascular: Negative.    Gastrointestinal: Negative.    Musculoskeletal:        Knee pain     Objective:     Vital Signs (Most Recent):  Temp: 97.4 °F (36.3 °C) (03/19/19 1128)  Pulse: 78 (03/19/19 1128)  Resp: 18 (03/19/19 1128)  BP: (!) 89/51 (03/19/19 1128)  SpO2: (!) 93 % (03/19/19 0738) Vital Signs (24h Range):  Temp:  [97.4 °F (36.3 °C)-97.9 °F (36.6 °C)] 97.4 °F (36.3 °C)  Pulse:  [66-82] 78  Resp:  [15-28] 18  SpO2:  [75 %-98 %] 93 %  BP: ()/(43-73) 89/51     Weight: 61.1 kg (134 lb 11.2 oz)  Body mass index is 19.89 kg/m².    Intake/Output Summary (Last 24 hours) at 3/19/2019 1459  Last data filed at 3/19/2019 0900  Gross per 24 hour   Intake 3501.75 ml   Output 1350 ml   Net 2151.75 ml      Physical Exam   Constitutional: She appears well-developed. No distress.   Sitting up in bed alert and talkative  Non toxic appearing   Cardiovascular: Normal rate and regular rhythm.   Pulmonary/Chest: Effort normal and breath sounds normal.   Abdominal: Soft. Bowel sounds are normal. She exhibits no distension. There is no tenderness. There is no guarding.   Musculoskeletal:   Brace + dressing placed left leg   Neurological: She is alert.   Skin: Capillary refill takes less than 2 seconds. She is not diaphoretic.   Psychiatric: She has a normal mood and affect. Her behavior is normal. Judgment and thought content normal.   Nursing note and vitals reviewed.      Significant Labs: All pertinent labs within the past 24 hours have been reviewed.    Significant Imaging: I have reviewed all pertinent imaging results/findings within the past 24 hours.  I have reviewed and interpreted all pertinent imaging results/findings within the past 24 hours.    Assessment/Plan:      * Closed fracture of left distal femur    S/p ORIF with cement augmentation on 3/18  About 400cc of estimated blood loss  Received one unit of PRBCs  PT/OT  consulted. I expect SNF for therapy  Will ask about chemical DVT prophylaxis  Aiming for non narcotic pain management  Appreciate further Ortho recs       Hypotension    Patient is asymptomatic, afebrile and without leukocytosis. Hgb stable post op after a transfusion  Continue IVFs, encourage PO intake and hold off on antihypertensives  Monitor closely         Acute blood loss anemia    Stable after a unit of blood given post op  CBC in AM       Chronic kidney disease, stage III (moderate)    Patient's renal function is at baseline  Continue fluids while low BP and appetite improves  Hopefull remove villa today  BMP in AM  I/Os       Dementia    At risk for delirium  Blinds open during daytime  Reorient frequently  Minimize sedative hypnotics           Coronary artery disease involving native coronary artery of native heart without angina pectoris    No acute issues. Is chest pain free and not complaining of SOB  Not on antiplatelet, BB, ACE-I or cholesterol lowering medication at NH  Will not tolerate BB or ACE-I currently. Will ask ortho about ASA at least.   Will obtain lipid panel to see if statin is indicated  Maintain hemodynamic stability with fluids       SLE (systemic lupus erythematosus)    I see no acute issues  Resume hydroxychloroquine         VTE Risk Mitigation (From admission, onward)        Ordered     IP VTE HIGH RISK PATIENT  Once      03/18/19 2103     Place LUCY hose  Until discontinued      03/17/19 2001              Zee Paz MD  Department of Hospital Medicine   [facility]

## 2019-03-19 NOTE — ASSESSMENT & PLAN NOTE
S/p ORIF with cement augmentation on 3/18  About 400cc of estimated blood loss  Received one unit of PRBCs  PT/OT consulted. I expect SNF for therapy  Will ask about chemical DVT prophylaxis  Aiming for non narcotic pain management  Appreciate further Ortho recs

## 2019-03-19 NOTE — ASSESSMENT & PLAN NOTE
At risk for delirium  Blinds open during daytime  Reorient frequently  Minimize sedative hypnotics

## 2019-03-19 NOTE — ASSESSMENT & PLAN NOTE
Patient's renal function is at baseline  Continue fluids while low BP and appetite improves  Hopefull remove villa today  BMP in AM  I/Os

## 2019-03-19 NOTE — ANESTHESIA POSTPROCEDURE EVALUATION
"Anesthesia Post Evaluation    Patient: Darrel Bennett    Procedure(s) Performed: Procedure(s) (LRB):  ORIF, FRACTURE, FEMUR DISTAL (Left)    Final Anesthesia Type: general  Patient location during evaluation: PACU  Patient participation: Yes- Able to Participate  Level of consciousness: awake and alert, oriented and awake  Post-procedure vital signs: reviewed and stable  Pain management: adequate  Airway patency: patent  PONV status at discharge: No PONV  Anesthetic complications: no      Cardiovascular status: blood pressure returned to baseline, hemodynamically stable and stable  Respiratory status: unassisted and spontaneous ventilation  Hydration status: euvolemic  Follow-up not needed.        Visit Vitals  BP (!) 108/51   Pulse 66   Temp 36.3 °C (97.4 °F) (Oral)   Resp 20   Ht 5' 9" (1.753 m)   Wt 61.1 kg (134 lb 11.2 oz)   SpO2 (!) 92%   Breastfeeding? No   BMI 19.89 kg/m²       Pain/Colby Score: Pain Rating Prior to Med Admin: 0 (3/18/2019 10:00 PM)  Pain Rating Post Med Admin: 8 (3/18/2019  2:07 AM)        "

## 2019-03-19 NOTE — PT/OT/SLP EVAL
Occupational Therapy   Evaluation    Name: Darrel Bennett  MRN: [id number]  Admitting Diagnosis:  Closed fracture of left distal femur 1 Day Post-Op    Recommendations:     Discharge Recommendations: (therapy at next level of care)  Discharge Equipment Recommendations:  none  Barriers to discharge:  Other (Comment)    Assessment:     Darrel Bennett is a 87 y.o. female with a medical diagnosis of Closed fracture of left distal femur.  She presents with decreased membory. Performance deficits affecting function: weakness, impaired endurance, impaired functional mobilty, impaired self care skills, gait instability, impaired balance, decreased lower extremity function, impaired cognition, decreased upper extremity function, decreased safety awareness, pain, impaired fine motor, orthopedic precautions, impaired muscle length, impaired joint extensibility, decreased ROM.      Rehab Prognosis: Fair; patient would benefit from acute skilled OT services to address these deficits and reach maximum level of function.       Plan:     Patient to be seen3   to address the above listed problems via self-care/home management, therapeutic activities, therapeutic exercises  · Plan of Care Expires:    · Plan of Care Reviewed with: patient, son    Subjective     Chief Complaint: pain when in supine  Patient/Family Comments/goals: to get better    Occupational Profile:  Living Environment: Pt resides at [facility]  Previous level of function: wheelchair transfers  Roles and Routines: assistance from staff  Equipment Used at Home:  wheelchair  Assistance upon Discharge: nursing home staff    Pain/Comfort:  · Pain Rating 1: (yes but pt is unable to give number)  · Pain Addressed 1: Pre-medicate for activity, Reposition, Nurse notified, Distraction    Patients cultural, spiritual, Zoroastrianism conflicts given the current situation:      Objective:     Communicated with: nurse Ryder prior to session.  Patient found supine with bed  alarm, peripheral IV, villa catheter upon OT entry to room.    General Precautions: Standard, fall   Orthopedic Precautions:LLE non weight bearing   Braces: Hinged knee brace     Occupational Performance:    Bed Mobility:    · Patient completed Rolling/Turning to Left with  moderate assistance  · Patient completed Rolling/Turning to Right with moderate assistance  · Patient completed Scooting/Bridging with moderate assistance  · Patient completed Supine to Sit with moderate assistance  · Patient completed Sit to Supine with moderate assistance    Functional Mobility/Transfers:  · Functional Mobility: sitting edge of bed only    Activities of Daily Living:  · Feeding:  contact guard assistance    · Grooming: minimum assistance    · Upper Body Dressing: maximal assistance      Cognitive/Visual Perceptual:  Cognitive/Psychosocial Skills:     -       Oriented to: Person   -       Follows Commands/attention:Inattentive and Easily distracted  -       Communication: clear/fluent  -       Memory: Impaired STM and Impaired LTM  -       Safety awareness/insight to disability: impaired   -       Mood/Affect/Coping skills/emotional control: Labile and Tearful  Visual/Perceptual:      -Intact      Physical Exam:  Upper Extremity Range of Motion:     -       Right Upper Extremity: WFL except digits  -       Left Upper Extremity: WFL except digits  Upper Extremity Strength:    -       Right Upper Extremity: WFL  -       Left Upper Extremity: WFL    AMPAC 6 Click ADL:  AMPAC Total Score: 11    Education:    Patient left supine with all lines intact, call button in reach and bed alarm on    GOALS:   Multidisciplinary Problems     Occupational Therapy Goals        Problem: Occupational Therapy Goal    Goal Priority Disciplines Outcome Interventions   Occupational Therapy Goal     OT, PT/OT Ongoing (interventions implemented as appropriate)    Description:  Goals to be met by: 3/31/19    Patient will increase functional independence  with ADLs by performing:    Feeding with Set-up Assistance.  UE Dressing with Minimal Assistance.  Grooming while seated with Supervision.  Sitting at edge of bed x20 minutes with Supervision.  Rolling to Bilateral with Contact Guard Assistance.   Supine to sit with Contact Guard Assistance.  Toilet transfer to bedside commode with Minimal Assistance.  Upper extremity exercise program x7 reps per handout, with assistance as needed.                      History:     Past Medical History:   Diagnosis Date    Allergy     Arthritis     CAD (coronary artery disease) 11/26/2013    Chronic pain 5/1/2013    DDD (degenerative disc disease), thoracic 10/4/2012    Depression 5/1/2013    Encounter for blood transfusion     Hammer toe 5/1/2013    Idiopathic thrombocytopenic purpura (ITP) 1/28/2013    Osteoarthritis 10/4/2012    Osteoporosis, postmenopausal 11/26/2013    Positive NAZANIN (antinuclear antibody) 1/28/2013    SLE (systemic lupus erythematosus) 5/1/2013    Spondylolysis of lumbar region 10/4/2012       Past Surgical History:   Procedure Laterality Date    ARTHROPLASTY-HIP Left 4/16/2015    Performed by [facility]mukh Dunbar [facility] at [facility] OR    ARTHROPLASTY-HIP-AGATA (ENDOPROSTHESIS) Left 3/24/2015    Performed by Joao Hilliard III, MD at [facility] OR    BIOPSY Left 4/16/2015    Performed by [facility]kh [facility]bar [facility] at [facility] OR    CYSTOSCOPY with bilateral  RETROGRADE and bilateral URETEROSCOPY with cystolithopaxy 2cm,  RIGHT holmium laser litho, bilateral STENT, rosenthal  PLACEMENT Bilateral 6/9/2015    Performed by Maik Mckeon MD at [facility] OR    CYSTOSCOPY WITH RETROGRADE PYELOGRAM Bilateral 4/18/2015    Performed by [facility]ik Mckeon [facility] at [facility] OR    HYSTERECTOMY      INCISION AND DRAINAGE (I & D) Left 4/16/2015    Performed by [facility]kh Dunbar [facility] at [facility] OR    ORIF, FRACTURE, FEMUR DISTAL Left 3/18/2019    Performed by Geo Posada MD at [facility] OR    PLACEMENT ROSENTHAL CATHETER N/A 4/18/2015     [facility] at [facility] OR    PLACEMENT-STENT URETERAL Right 4/18/2015    Per[facility] at [facility] OR    REVISION-ARTHROPLASTY-HIP AGATA TO TOTAL Left 4/16/2015    Performed by [facility]kh [facility]bar [facility] at [facility] OR    right hip athroplasty Right     SPLENECTOMY, TOTAL      TOTAL KNEE ARTHROPLASTY Left        Time Tracking:     OT Date of Treatment: 03/19/19  OT Start Time: 1320  OT Stop Time: 1337  OT Total Time (min): 17 min    Billable Minutes:Evaluation 17 with PT    Eda Rivera OT  3/19/2019

## 2019-03-19 NOTE — PROGRESS NOTES
Patient arrived to unit from [facility] via stretcher per transport. No acute distress noted. Ace wrap dressing with leg immobilizer in place. No family at bedside. Normal saline infusing to right forearm. Will continue to monitor.

## 2019-03-19 NOTE — PROGRESS NOTES
Patient blood pressure = 82/47. Dr. Benitez made aware. Orders to give a 1 liter bolus over 1 hour. Will continue to monitor.

## 2019-03-19 NOTE — NURSING
Pt aaox1, confused at times and anxious. C/o pain to right and left knee, tylenol 1g iv given as well as low dose of norco with significant improvement. Pt denies pain at the moment. Leo cath draining yellow john urine. L. Leg wrapped with ace wrap  With leg immobilizer. Pulled iv out, new one to L.UA. Multiple bruises to both arms. Foam dressing to applied to sacrum for protection. telesitter in place. Call light w/i reach, bed alarm on

## 2019-03-19 NOTE — PT/OT/SLP EVAL
Physical Therapy Evaluation    Patient Name:  aDrrel Bennett   MRN:  [id number]    Recommendations:     Discharge Recommendations:  (PT at next level of care)   Discharge Equipment Recommendations: none   Barriers to discharge: None    Assessment:     Darrel Bennett is a 87 y.o. female admitted with a medical diagnosis of Closed fracture of left distal femur.  She presents with the following impairments/functional limitations:  weakness, impaired functional mobilty, decreased safety awareness, impaired coordination, impaired endurance, gait instability, decreased coordination, pain, impaired fine motor, impaired balance, decreased upper extremity function, decreased lower extremity function, impaired self care skills, orthopedic precautions, impaired skin .    Rehab Prognosis: Fair; patient would benefit from acute skilled PT services to address these deficits and reach maximum level of function.    Recent Surgery: Procedure(s) (LRB):  ORIF, FRACTURE, FEMUR DISTAL (Left) 1 Day Post-Op    Plan:     During this hospitalization, patient to be seen (2-3x/wk) to address the identified rehab impairments via therapeutic activities, therapeutic exercises, wheelchair management/training and progress toward the following goals:    · Plan of Care Expires:  04/02/19    Subjective     Chief Complaint: pain  Patient/Family Comments/goals: plof  Pain/Comfort:  · Pain Rating 1: (not rated)  · Location 1: (L LE)  · Pain Addressed 1: Pre-medicate for activity, Reposition, Distraction, Cessation of Activity, Nurse notified    Patients cultural, spiritual, Confucianist conflicts given the current situation: no    Living Environment:  Pt is a alf NH resident  Prior to admission, patients level of function was required assistance to t/f to W/C, able to propel W/C with B Le's.  Equipment used at home: wheelchair.  DME owned (not currently used): none.  Upon discharge, patient will have assistance from Staff.    Objective:      Communicated with nsg prior to session.  Patient found call button in reach and familky present bed alarm  upon PT entry to room.    General Precautions: Standard, fall   Orthopedic Precautions:LLE non weight bearing   Braces: Hinged knee brace(0-90*)     Exams:  · Cognitive Exam:  Patient is oriented to Person and Place  · Gross Motor Coordination:  impaied 2/2 gen weakness and deconditoning  · Postural Exam:  Patient presented with the following abnormalities:    · -       Rounded shoulders  · -       Forward head  · -       Kyphosis  · Sensation:    · -       Intact  light/touch B Le's  · Skin Integrity/Edema:      · -       Skin integrity: L LE with sx dressing intact  · RLE ROM: knee max limited approx 90-80*  · RLE Strength: N/T  · LLE ROM: limited in brace to 0-90*  · LLE Strength: N/T    Functional Mobility:  · Bed Mobility:     · Scooting: moderate assistance to EOB in sitting, Max x 2 people along EOB in siting, NWB L LE  · Supine to Sit: moderate assistance  · Balance, Fair+ in sitting      Therapeutic Activities and Exercises:   eval only    AM-PAC 6 CLICK MOBILITY  Total Score:9     Patient left HOB elevated with call button in reach, bed alarm on, nsg notified and family present.    GOALS:   Multidisciplinary Problems     Physical Therapy Goals        Problem: Physical Therapy Goal    Goal Priority Disciplines Outcome Goal Variances Interventions   Physical Therapy Goal     PT, PT/OT Ongoing (interventions implemented as appropriate)     Description:  Goals to be met by: 3/26/2019     Patient will increase functional independence with mobility by performin. Supine to sit with Stand-by Assistance  2. Bed to chair transfer with Minimal Assistance using Slideboard  3. Wheelchair propulsion x100 feet with Stand-by Assistance using B UE's and  right lower extremity  4. 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    Positive NAZANIN (antinuclear antibody) 1/28/2013    SLE (systemic lupus erythematosus) 5/1/2013    Spondylolysis of lumbar region 10/4/2012       Past Surgical History:   Procedure Laterality Date    ARTHROPLASTY-HIP Left 4/16/2015    Performed [facility]bar [facility] at [facility] OR    ARTHROPLASTY-HIP-AGATA (ENDOPROSTHESIS) Left 3/24/2015    Performed by Joao Hilliard III, MD at [facility] OR    BIOPSY Left 4/16/2015    Performed by [facility]kh [facility]bar [facility] at [facility] OR    CYSTOSCOPY with bilateral  RETROGRADE and bilateral URETEROSCOPY with cystolithopaxy 2cm,  RIGHT holmium laser litho, bilateral STENT, rosenthal  PLACEMENT Bilateral 6/9/2015    Performed by Maik Mckeon MD at [facility] OR    CYSTOSCOPY WITH RETROGRADE PYELOGRAM Bilateral 4/18/2015    Performed by Maik Mckeon MD at [facility] OR    HYSTERECTOMY      INCISION AND DRAINAGE (I & D) Left 4/16/2015    Performed by [facility]kh [facility]bar [facility] at [facility]    ORIF, FRACTURE, FEMUR DISTAL Left 3/18/2019    Performed by Geo Posada MD at [facility]    PLACEMENT ROSENTHAL CATHETER N/A 4/18/2015    Performed by Maik Mckeon MD at [facility]    PLACEMENT-STENT URETERAL Right 4/18/2015    Performed by [facility]ik Mckeon MD at [facility] OR    REVISION-ARTHROPLASTY-HIP AGATA TO TOTAL Left 4/16/2015    Performed by [facility]kh [facility]bar MD at [facility] OR    right hip athroplasty Right     SPLENECTOMY, TOTAL      TOTAL KNEE ARTHROPLASTY Left        Time Tracking:     PT Received On: 03/19/19  PT Start Time: 1320     PT Stop Time: 1337  PT Total Time (min): 17 min     Billable  Minutes: Evaluation 17 OT present      Angi Kessler, PT  03/19/2019

## 2019-03-19 NOTE — ASSESSMENT & PLAN NOTE
No acute issues. Is chest pain free and not complaining of SOB  Not on antiplatelet, BB, ACE-I or cholesterol lowering medication at NH  Will not tolerate BB or ACE-I currently. Will ask ortho about ASA at least.   Will obtain lipid panel to see if statin is indicated  Maintain hemodynamic stability with fluids

## 2019-03-19 NOTE — ADDENDUM NOTE
Addendum  created 03/19/19 0756 by Khadijah Hadley CRNA    Intraprocedure Meds edited, Orders acknowledged in Narrator

## 2019-03-20 LAB
ALBUMIN SERPL BCP-MCNC: 2.3 G/DL
ALP SERPL-CCNC: 101 U/L
ALT SERPL W/O P-5'-P-CCNC: 15 U/L
ANION GAP SERPL CALC-SCNC: 4 MMOL/L
AST SERPL-CCNC: 36 U/L
BASOPHILS # BLD AUTO: 0.03 K/UL
BASOPHILS NFR BLD: 0.3 %
BILIRUB SERPL-MCNC: 0.7 MG/DL
BUN SERPL-MCNC: 25 MG/DL
CALCIUM SERPL-MCNC: 8.2 MG/DL
CHLORIDE SERPL-SCNC: 112 MMOL/L
CHOLEST SERPL-MCNC: 89 MG/DL
CHOLEST/HDLC SERPL: 3.1 {RATIO}
CO2 SERPL-SCNC: 23 MMOL/L
CREAT SERPL-MCNC: 1 MG/DL
DIFFERENTIAL METHOD: ABNORMAL
EOSINOPHIL # BLD AUTO: 0.1 K/UL
EOSINOPHIL NFR BLD: 1.1 %
ERYTHROCYTE [DISTWIDTH] IN BLOOD BY AUTOMATED COUNT: 15.9 %
EST. GFR  (AFRICAN AMERICAN): 59 ML/MIN/1.73 M^2
EST. GFR  (NON AFRICAN AMERICAN): 51 ML/MIN/1.73 M^2
GLUCOSE SERPL-MCNC: 73 MG/DL
HCT VFR BLD AUTO: 25.4 %
HDLC SERPL-MCNC: 29 MG/DL
HDLC SERPL: 32.6 %
HGB BLD-MCNC: 8.1 G/DL
LDLC SERPL CALC-MCNC: 45.4 MG/DL
LYMPHOCYTES # BLD AUTO: 2.2 K/UL
LYMPHOCYTES NFR BLD: 24.3 %
MCH RBC QN AUTO: 30.8 PG
MCHC RBC AUTO-ENTMCNC: 31.9 G/DL
MCV RBC AUTO: 97 FL
MONOCYTES # BLD AUTO: 1.2 K/UL
MONOCYTES NFR BLD: 13 %
NEUTROPHILS # BLD AUTO: 5.6 K/UL
NEUTROPHILS NFR BLD: 61.3 %
NONHDLC SERPL-MCNC: 60 MG/DL
PLATELET # BLD AUTO: 216 K/UL
PMV BLD AUTO: 9.8 FL
POTASSIUM SERPL-SCNC: 4.3 MMOL/L
PROT SERPL-MCNC: 5.5 G/DL
RBC # BLD AUTO: 2.63 M/UL
SODIUM SERPL-SCNC: 139 MMOL/L
TRIGL SERPL-MCNC: 73 MG/DL
WBC # BLD AUTO: 9.18 K/UL

## 2019-03-20 PROCEDURE — 11000001 HC ACUTE MED/SURG PRIVATE ROOM: Mod: HCNC

## 2019-03-20 PROCEDURE — 80061 LIPID PANEL: CPT | Mod: HCNC

## 2019-03-20 PROCEDURE — 97530 THERAPEUTIC ACTIVITIES: CPT | Mod: HCNC

## 2019-03-20 PROCEDURE — 25000003 PHARM REV CODE 250: Mod: HCNC | Performed by: INTERNAL MEDICINE

## 2019-03-20 PROCEDURE — 36415 COLL VENOUS BLD VENIPUNCTURE: CPT | Mod: HCNC

## 2019-03-20 PROCEDURE — 94761 N-INVAS EAR/PLS OXIMETRY MLT: CPT | Mod: HCNC

## 2019-03-20 PROCEDURE — C9113 INJ PANTOPRAZOLE SODIUM, VIA: HCPCS | Mod: HCNC | Performed by: EMERGENCY MEDICINE

## 2019-03-20 PROCEDURE — 97535 SELF CARE MNGMENT TRAINING: CPT | Mod: HCNC

## 2019-03-20 PROCEDURE — 63600175 PHARM REV CODE 636 W HCPCS: Mod: HCNC | Performed by: EMERGENCY MEDICINE

## 2019-03-20 PROCEDURE — 63600175 PHARM REV CODE 636 W HCPCS: Mod: HCNC | Performed by: INTERNAL MEDICINE

## 2019-03-20 PROCEDURE — 85025 COMPLETE CBC W/AUTO DIFF WBC: CPT | Mod: HCNC

## 2019-03-20 PROCEDURE — 80053 COMPREHEN METABOLIC PANEL: CPT | Mod: HCNC

## 2019-03-20 PROCEDURE — 25000003 PHARM REV CODE 250: Mod: HCNC | Performed by: HOSPITALIST

## 2019-03-20 RX ORDER — SODIUM CHLORIDE 9 MG/ML
INJECTION, SOLUTION INTRAVENOUS CONTINUOUS
Status: DISCONTINUED | OUTPATIENT
Start: 2019-03-20 | End: 2019-03-21

## 2019-03-20 RX ORDER — ACETAMINOPHEN 10 MG/ML
1000 INJECTION, SOLUTION INTRAVENOUS ONCE
Status: COMPLETED | OUTPATIENT
Start: 2019-03-20 | End: 2019-03-20

## 2019-03-20 RX ORDER — ACETAMINOPHEN 325 MG/1
650 TABLET ORAL EVERY 4 HOURS PRN
Status: DISCONTINUED | OUTPATIENT
Start: 2019-03-20 | End: 2019-03-22 | Stop reason: HOSPADM

## 2019-03-20 RX ORDER — OXYCODONE AND ACETAMINOPHEN 10; 325 MG/1; MG/1
1 TABLET ORAL EVERY 6 HOURS PRN
Status: DISCONTINUED | OUTPATIENT
Start: 2019-03-20 | End: 2019-03-20

## 2019-03-20 RX ADMIN — FLUOXETINE HYDROCHLORIDE 40 MG: 20 CAPSULE ORAL at 08:03

## 2019-03-20 RX ADMIN — MUPIROCIN 1 G: 20 OINTMENT TOPICAL at 09:03

## 2019-03-20 RX ADMIN — POLYETHYLENE GLYCOL 3350 17 G: 17 POWDER, FOR SOLUTION ORAL at 08:03

## 2019-03-20 RX ADMIN — ALLOPURINOL 100 MG: 100 TABLET ORAL at 08:03

## 2019-03-20 RX ADMIN — ACETAMINOPHEN 650 MG: 325 TABLET, FILM COATED ORAL at 04:03

## 2019-03-20 RX ADMIN — SODIUM CHLORIDE: 0.9 INJECTION, SOLUTION INTRAVENOUS at 11:03

## 2019-03-20 RX ADMIN — PANTOPRAZOLE SODIUM 40 MG: 40 INJECTION, POWDER, FOR SOLUTION INTRAVENOUS at 08:03

## 2019-03-20 RX ADMIN — ACETAMINOPHEN 1000 MG: 10 INJECTION, SOLUTION INTRAVENOUS at 11:03

## 2019-03-20 RX ADMIN — OXYCODONE AND ACETAMINOPHEN 1 TABLET: 10; 325 TABLET ORAL at 01:03

## 2019-03-20 RX ADMIN — MUPIROCIN 1 G: 20 OINTMENT TOPICAL at 08:03

## 2019-03-20 RX ADMIN — HYDROXYCHLOROQUINE SULFATE 200 MG: 200 TABLET, FILM COATED ORAL at 08:03

## 2019-03-20 NOTE — PROGRESS NOTES
Patient in bed mostly c/o pain in her right hip and groin.   VSSAF    Left LE: Leg in immobilizer. Dressing c/d/i. NVI throughout  Right LE: On a foam wedge. Pain with internal and external rotation of hip.  Hct: 25.4    A/P: POD#2 ORIF left distal femur fracture/ right hip pain  1) NWB left LE  2) xray right hip ordered- will follow for results  3) med mgm per primary team

## 2019-03-20 NOTE — PLAN OF CARE
Problem: Physical Therapy Goal  Goal: Physical Therapy Goal  Goals to be met by: 3/26/2019     Patient will increase functional independence with mobility by performin. Supine to sit with Stand-by Assistance  2. Bed to chair transfer with Minimal Assistance using Slideboard  3. Wheelchair propulsion x100 feet with Stand-by Assistance using B UE's and  right lower extremity  4. Lower extremity exercise program x10 reps per handout, with assistance as needed     Outcome: Ongoing (interventions implemented as appropriate)  PT for functional mob training

## 2019-03-20 NOTE — ASSESSMENT & PLAN NOTE
S/p ORIF with cement augmentation on 3/18  About 400cc of estimated blood loss  Received one unit of PRBCs  PT/OT consulted. Patient is non weight bearing therefore very limited.   Hold chemical DVT prophylaxis pending repeat CBC  Aiming for non narcotic pain management!   Appreciate further Ortho recs

## 2019-03-20 NOTE — ASSESSMENT & PLAN NOTE
Patient's renal function is stable  Continue fluids while low BP (better) and appetite improves  Leo out  BMP in AM  I/Os

## 2019-03-20 NOTE — PROGRESS NOTES
XRay of the right hip reviewed with Dr Taveras. Some cortical irregularity on superior and inferior rami of right hip. Will order CT of the pelvis to rule out fracture.

## 2019-03-20 NOTE — PLAN OF CARE
Problem: Occupational Therapy Goal  Goal: Occupational Therapy Goal  Goals to be met by: 3/31/19    Patient will increase functional independence with ADLs by performing:    Feeding with Set-up Assistance.  UE Dressing with Minimal Assistance.  Grooming while seated with Supervision.  Sitting at edge of bed x20 minutes with Supervision.  Rolling to Bilateral with Contact Guard Assistance.   Supine to sit with Contact Guard Assistance.  Toilet transfer to bedside commode with Minimal Assistance.  Upper extremity exercise program x7 reps per handout, with assistance as needed.     Outcome: Ongoing (interventions implemented as appropriate)  Patient tolerated sitting EOB ~25 mins with SBA-CGA to maintain balance during ADL's/therex.  Patient pleasantly confused. Patient will benefit from OT at next level of care.

## 2019-03-20 NOTE — ASSESSMENT & PLAN NOTE
At risk for delirium  Blinds open during daytime  Reorient frequently  Avoid sedative hypnotics

## 2019-03-20 NOTE — PROGRESS NOTES
Patient agitated and pulling at surgical dressing and pulled out Iv. Unsure if patient received all of IV tylenol and still complaining of pain. Dr. Benitez notified. New orders given for po prn pain meds. Will continue to monitor .

## 2019-03-20 NOTE — PROGRESS NOTES
Orthopedic Postop Progress Note    Postop day: 1    ID: The patient is a 87 y.o. female status post: LEFT distal femur ORIF of periprosthetic frature  Nurse reports [place]    Overnight Events: none    Vitals:    03/19/19 2048   BP: 123/70   Pulse: 88   Resp: 17   Temp: 98.8 °F (37.1 °C)       Drain Output  03/18 1901 - 03/19 1900  In: 2358.8   Out: 1300     Physical Exam:  NAD, A/O to baseline  Wound c/d/i with clean dressing.  No focal motor or sensory deficits noted.    Assessment: The patient is a 87 y.o. female status post: ORIF of periprosthetic distal femur fracture    Plan:  1) Antibiotics: post op ancef  2) Weight bearing status: No weight bearing, Patient in splint and hinged knee brace locked in position at all times  3) Labs: post op labs reviewed  4) DVT Prophylaxis: lovenox  5) Lines/Drains: PIV  6) Dispo: No weight bearing, splint and brace, no ROM  7) sitter in room at all times as she has dementia and is inclined to pull her IV out, and want to get out of bed

## 2019-03-20 NOTE — PLAN OF CARE
03/18/19 1122   Discharge Assessment   Assessment Type Discharge Planning Assessment  (Attempt to do assessment, patient unable to remember info, attempt to reach daughter Rani @ [phone], no answer.)

## 2019-03-20 NOTE — PROGRESS NOTES
Dr. Guardado informed of patient signs of pain. Patient is moaning and groaning. Order for 1 time dose of Iv tylenol. Will continue to monitor.

## 2019-03-20 NOTE — PT/OT/SLP PROGRESS
Occupational Therapy   Treatment    Name: Darrel Bennett  MRN: [id number]  Admitting Diagnosis:  Closed fracture of left distal femur  2 Days Post-Op    Recommendations:     Discharge Recommendations: (therapy at next level of care)  Discharge Equipment Recommendations:  none  Barriers to discharge:       Assessment:     Darrel Bennett is a 87 y.o. female with a medical diagnosis of Closed fracture of left distal femur.  She presents with the following performance deficits affecting function: weakness, impaired endurance, impaired self care skills, impaired functional mobilty, impaired balance, gait instability, impaired cognition, decreased coordination, decreased lower extremity function, decreased upper extremity function, decreased safety awareness, impaired coordination, decreased ROM, pain, impaired fine motor, impaired cardiopulmonary response to activity, orthopedic precautions, impaired joint extensibility, impaired muscle length, impaired skin, edema. Patient tolerated sitting EOB ~25 mins with SBA-CGA to maintain balance during ADL's/therex.  Patient pleasantly confused.     Rehab Prognosis:  Fair; patient would benefit from acute skilled OT services to address these deficits and reach maximum level of function.       Plan:     Patient to be seen   to address the above listed problems via self-care/home management, therapeutic activities, therapeutic exercises  · Plan of Care Expires:    · Plan of Care Reviewed with: patient, son    Subjective   Patient agreeable to therapy. Per son, patient continues to try to sit up. Patient pleasantly confused.  Pain/Comfort:  · Pain Rating 1: (unable to rate)  · Location - Side 1: Right  · Location 1: leg(did not c/o LLE pain)  · Nursing notified, pre-medicated for activity, reposition    Objective:     Communicated with: Nurse Ryder prior to session.  Patient found right sidelying on wedge with son present bed alarm, peripheral IV upon OT entry to room.    General  Precautions: Standard, fall   Orthopedic Precautions:LLE non weight bearing   Braces: Hinged knee brace     Occupational Performance:     Bed Mobility:    · Patient completed Rolling/Turning to Left with  minimum assistance  · Patient completed Rolling/Turning to Right with minimum assistance  · Patient completed Scooting/Bridging with maximal assistance to EOB, dependence x 2 persons to HOB  · Patient completed Supine to Sit with maximal assistance  · Patient completed Sit to Supine with maximal assistance     Activities of Daily Living:  · Feeding:  set up assistance    · Grooming: set up assistance to wash face, perform oral care with mouthwash, and to comb hair while seated EOB, CGA to maintain sitting balance during task.    · Toileting: dependence     ·   AMPAC 6 Click ADL: 12    Treatment & Education:  Patient performed bed mobility and ADL's as above.  Patient tolerated sitting EOB ~25 mins with SBA-CGA to maintain sitting balance during therapy tasks.   Patient able to perform x 10 reps BUE elbow flex/ext AROM.     Patient left HOB elevated with all lines intact, call button in reach, bed alarm on, nurse notified and AVASYS and son presentEducation:      GOALS:   Multidisciplinary Problems     Occupational Therapy Goals        Problem: Occupational Therapy Goal    Goal Priority Disciplines Outcome Interventions   Occupational Therapy Goal     OT, PT/OT Ongoing (interventions implemented as appropriate)    Description:  Goals to be met by: 3/31/19    Patient will increase functional independence with ADLs by performing:    Feeding with Set-up Assistance.  UE Dressing with Minimal Assistance.  Grooming while seated with Supervision.  Sitting at edge of bed x20 minutes with Supervision.  Rolling to Bilateral with Contact Guard Assistance.   Supine to sit with Contact Guard Assistance.  Toilet transfer to bedside commode with Minimal Assistance.  Upper extremity exercise program x7 reps per handout, with  assistance as needed.                      Time Tracking:     OT Date of Treatment: 03/20/19  OT Start Time: 1423  OT Stop Time: 1502  OT Total Time (min): 39 min    Billable Minutes:Self Care/Home Management 17  Therapeutic Activity 12 (partial co-tx with PT)    MARIANELA Macdonald  3/20/2019

## 2019-03-20 NOTE — ASSESSMENT & PLAN NOTE
Patient is asymptomatic, afebrile and without leukocytosis.   Continue IVFs, encourage PO intake and hold off on antihypertensives  Avoid narcotics!  Monitor closely

## 2019-03-20 NOTE — OP NOTE
DATE OF PROCEDURE:  03/18/2019    SURGEON:  Geo Posada MD.    ASSISTANT:  Shant Brink MD    OPERATIVE PROCEDURE PERFORMED:  Open reduction and internal fixation of left   periprosthetic distal femur fracture with cement augmentation.    TOURNIQUET TIME:  None.    ANTIBIOTICS:  NSAID.    IMPLANTS:  Synthes 8 hole distal femur plate with methylmethacrylate cement   augmentation.    SPECIMENS:  None.    COMPLICATIONS:  None.    ESTIMATED BLOOD LOSS:  See Anesthesia record.    BRIEF INDICATION OF PROCEDURE:  Ms. Darrel Bennett is an 87-year-old female with   history of having had a fall resulting in left distal femur fracture just   proximal to her total knee arthroplasty.  The risks, benefits and alternatives   and anticipated convalescence, surgical versus nonsurgical management was   explained in detail to the patient and the patient's family, specifically her   daughter who opted against nonoperative management and open reduction and   internal fixation.    PROCEDURE IN DETAIL:  On the day of surgery, the patient was brought to the   Operating Room, placed in supine position, and anesthesia was administered.    Subsequently, the patient was prepped and draped in typical sterile fashion for   left lower extremity surgery.  Subsequently, a timeout was called to indicate   the correct patient, laterality, surgery to be performed as well as   administration of IV antibiotics.  Everybody agreed and we elected to proceed   with surgery.  Subsequently, a standard lateral approach to the distal femur was   undertaken and an incision was made with a 10 blade scalpel.  Blunt dissection   was carried down to the level of fascia.  The fascia was transected and the   lateralis muscle was elevated superiorly.  Hohmann retractors were placed,   fracture was identified.  Quality of bone, the bone stock, the distal femur was   found to be quite poor.  A large hematoma was evacuated, methylmethacrylate   cement was then  injected.  After appropriate reduction was confirmed with AP and   lateral fluoroscopy, the above-mentioned distal femur plate was fixed to the   bone and it was approximated to the bone with cortical screw, it was found to   adequately approximate the bone.  Subsequently, distal locker screws and   proximal locker screws were placed into the available bone stock and bone stock   was found to be of poor quality and quantity, purchase was obtained.  The   decision was made to augment with methylmethacrylate cement.  It was then   injected into the distal femoral fracture in a cavitary defect and subsequently   once the cement hardened, distal locker screws were placed through the   above-mentioned size, construct was found to be stable.  Screw length was found   to be appropriate with fluoroscopy, and the wound was irrigated with copious   amounts of normal saline after appropriate distal fixation and bone and cement   was obtained.  The wound was irrigated with copious amounts of normal saline.    The deep structures and fascia was closed with a combination of Vicryl sutures   and #1 Stratafix sutures.  Subcutaneous tissue was closed with Vicryl sutures as   well Strattice and the skin was closed with inverted 2-0 Vicryl sutures and   staples.  The patient tolerated the procedure well.  All surgical and sponge   counts were correct at the end of the case.  The patient was placed in a   posterior slab splint as well as a hinged knee brace.  She has a history of   noncompliance with her dementia.    POSTOPERATIVE PLAN FOR THE PATIENT:  Nonweightbearing.      PRISCILLA  dd: 03/19/2019 22:58:59 (CDT)  td: 03/20/2019 05:51:13 (CDT)  Doc ID   #[id number]  Job ID #[id number]    CC:

## 2019-03-20 NOTE — PLAN OF CARE
Problem: Adult Inpatient Plan of Care  Goal: Plan of Care Review  Outcome: Ongoing (interventions implemented as appropriate)  S/p ORIF to left hip. Dressing and immobilizer noted. Oriented to self. Voiding well per villa cath. Skin integrity maintained. Patient agitated and confused throughout shift and pulling at IV and surgical dressing. Iv fluids maintained. Free of falls. Call light within reach. Bed in low position. No issues during shift. Continue plan of care. Sitter at bedside.

## 2019-03-20 NOTE — ASSESSMENT & PLAN NOTE
Decreased to 8.1 g/dL today. Will monitor closely and order another type and screen in case she needs another unit.   CBC in AM

## 2019-03-20 NOTE — PROGRESS NOTES
[facility] Hospital Medicine  Progress Note    Patient Name: Darrel Bennett  MRN: [id number]  Patient Class: IP- Inpatient   Admission Date: 3/17/2019  Length of Stay: 3 days  Attending Physician: Zee Altman MD  Primary Care Provider: Primary Doctor No        Subjective:     Principal Problem:Closed fracture of left distal femur    HPI:  Darrel Bennett is a 87 y.o. female that (in part)  has a past medical history of Allergy, Arthritis, CAD (coronary artery disease), Chronic pain, DDD (degenerative disc disease), thoracic, Depression, Encounter for blood transfusion, Hammer toe, Idiopathic thrombocytopenic purpura (ITP), Osteoarthritis, Osteoporosis, postmenopausal, Positive NAZANIN (antinuclear antibody), SLE (systemic lupus erythematosus), and Spondylolysis of lumbar region.  has a past surgical history that includes Hysterectomy; Splenectomy, total; right hip athroplasty (Right); and Total knee arthroplasty (Left). Presents to [facility] Emergency Department complaining of left knee pain status post fall as described below in detail.                    Description of symptoms     Location:  Left knee  Onset:  Acute s/p fall  Character:  Sharp aching pain  Frequency:  One episode  Duration:  constant, ataxia and pain w/ weight bearing  Radiation:  From left knee to proximal thigh  Exacerbating factors:  Worsened w/ weight bearing  Relieving factors:  Some relief with pain medications            In the emergency department xray showed left acute distal femur periprosthetic fracture.  This was confirmed with CT of lower extremity. Ortho notified. Planning for ORIF on Monday.     Hospital medicine has been asked to admit for further evaluation and treatment.   Associated Symptoms:  No paresthesias        Hospital Course:  No notes on file    Interval History: delirious last night. Received a dose of percocet 10 mg at around 1 AM. States she is not in pain today and feels  well, just tired and would like to sleep.     Review of Systems   Respiratory: Negative.    Cardiovascular: Negative.    Gastrointestinal: Negative.    Musculoskeletal: Negative.      Objective:     Vital Signs (Most Recent):  Temp: 99 °F (37.2 °C) (03/20/19 0726)  Pulse: 86 (03/20/19 0726)  Resp: 20 (03/20/19 0726)  BP: (!) 104/55 (03/20/19 0726)  SpO2: (!) 91 % (03/20/19 0810) Vital Signs (24h Range):  Temp:  [97.4 °F (36.3 °C)-99 °F (37.2 °C)] 99 °F (37.2 °C)  Pulse:  [78-88] 86  Resp:  [15-20] 20  SpO2:  [85 %-98 %] 91 %  BP: ()/(51-70) 104/55     Weight: 61.1 kg (134 lb 11.2 oz)  Body mass index is 19.89 kg/m².    Intake/Output Summary (Last 24 hours) at 3/20/2019 1048  Last data filed at 3/20/2019 0800  Gross per 24 hour   Intake 1617.5 ml   Output --   Net 1617.5 ml      Physical Exam   Constitutional: She appears well-developed. No distress.   Cardiovascular: Normal rate and regular rhythm.   Pulmonary/Chest: Effort normal and breath sounds normal.   Abdominal: Soft. Bowel sounds are normal.   Neurological:   Oriented to person only. Cooperative with exam and able to verbalize needs + symptoms   Is a bit drowsy still   Skin: She is not diaphoretic.   Nursing note and vitals reviewed.      Significant Labs: All pertinent labs within the past 24 hours have been reviewed.    Significant Imaging: I have reviewed all pertinent imaging results/findings within the past 24 hours.  I have reviewed and interpreted all pertinent imaging results/findings within the past 24 hours.    Assessment/Plan:      * Closed fracture of left distal femur    S/p ORIF with cement augmentation on 3/18  About 400cc of estimated blood loss  Received one unit of PRBCs  PT/OT consulted. Patient is non weight bearing therefore very limited.   Hold chemical DVT prophylaxis pending repeat CBC  Aiming for non narcotic pain management!   Appreciate further Ortho recs       Hypotension    Patient is asymptomatic, afebrile and without  leukocytosis.   Continue IVFs, encourage PO intake and hold off on antihypertensives  Avoid narcotics!  Monitor closely         Acute blood loss anemia    Decreased to 8.1 g/dL today. Will monitor closely and order another type and screen in case she needs another unit.   CBC in AM       Chronic kidney disease, stage III (moderate)    Patient's renal function is stable  Continue fluids while low BP (better) and appetite improves  Leo out  BMP in AM  I/Os       Dementia    At risk for delirium  Blinds open during daytime  Reorient frequently  Avoid sedative hypnotics           Coronary artery disease involving native coronary artery of native heart without angina pectoris    No acute issues. Is chest pain free and not complaining of SOB  Not on antiplatelet, BB, ACE-I or cholesterol lowering medication at NH  Will not tolerate BB or ACE-I currently. Will ask ortho about ASA at least.   LDL at goal therefore statin not indicated  Maintain hemodynamic stability with fluids       SLE (systemic lupus erythematosus)    I see no acute issues  Resumed hydroxychloroquine         VTE Risk Mitigation (From admission, onward)        Ordered     IP VTE HIGH RISK PATIENT  Once      03/18/19 2103     Place [facility]  Until discontinued      03/17/19 2001        Dispo: back to [facility] once cleared by ortho    Plan discussed with patient's daughter over the phone    Zee Paz MD  Department of Hospital Medicine   [facility]

## 2019-03-20 NOTE — CONSULTS
Patient will discharge back to [facility] when medically stable for discharge.

## 2019-03-20 NOTE — ASSESSMENT & PLAN NOTE
No acute issues. Is chest pain free and not complaining of SOB  Not on antiplatelet, BB, ACE-I or cholesterol lowering medication at NH  Will not tolerate BB or ACE-I currently. Will ask ortho about ASA at least.   LDL at goal therefore statin not indicated  Maintain hemodynamic stability with fluids

## 2019-03-20 NOTE — NURSING
Pt aaox1 confused most of the time, trying to sit at edge of bed. Sitter and avasys in room at all times. Leo removed this am, incontinence care provided as needed.  Tylenol iv and oral given for pain with moderate relief at times. Warm packs also applied to R. Ankle, knee and hip, pt states that sometimes it helps. L. Leg with immobilizer in place. Iv fluids infusing as ordered. Call light w/i reach. Bed alarm on.

## 2019-03-20 NOTE — SUBJECTIVE & OBJECTIVE
Interval History: delirious last night. Received a dose of percocet 10 mg at around 1 AM. States she is not in pain today and feels well, just tired and would like to sleep.     Review of Systems   Respiratory: Negative.    Cardiovascular: Negative.    Gastrointestinal: Negative.    Musculoskeletal: Negative.      Objective:     Vital Signs (Most Recent):  Temp: 99 °F (37.2 °C) (03/20/19 0726)  Pulse: 86 (03/20/19 0726)  Resp: 20 (03/20/19 0726)  BP: (!) 104/55 (03/20/19 0726)  SpO2: (!) 91 % (03/20/19 0810) Vital Signs (24h Range):  Temp:  [97.4 °F (36.3 °C)-99 °F (37.2 °C)] 99 °F (37.2 °C)  Pulse:  [78-88] 86  Resp:  [15-20] 20  SpO2:  [85 %-98 %] 91 %  BP: ()/(51-70) 104/55     Weight: 61.1 kg (134 lb 11.2 oz)  Body mass index is 19.89 kg/m².    Intake/Output Summary (Last 24 hours) at 3/20/2019 1048  Last data filed at 3/20/2019 0800  Gross per 24 hour   Intake 1617.5 ml   Output --   Net 1617.5 ml      Physical Exam   Constitutional: She appears well-developed. No distress.   Cardiovascular: Normal rate and regular rhythm.   Pulmonary/Chest: Effort normal and breath sounds normal.   Abdominal: Soft. Bowel sounds are normal.   Neurological:   Oriented to person only. Cooperative with exam and able to verbalize needs + symptoms   Is a bit drowsy still   Skin: She is not diaphoretic.   Nursing note and vitals reviewed.      Significant Labs: All pertinent labs within the past 24 hours have been reviewed.    Significant Imaging: I have reviewed all pertinent imaging results/findings within the past 24 hours.  I have reviewed and interpreted all pertinent imaging results/findings within the past 24 hours.

## 2019-03-20 NOTE — PT/OT/SLP PROGRESS
Physical Therapy Treatment    Patient Name:  Darrel Bennett   MRN:  [id number]    Recommendations:     Discharge Recommendations:  nursing facility, skilled   Discharge Equipment Recommendations: none   Barriers to discharge: Decreased caregiver support    Assessment:     Darrel Bennett is a 87 y.o. female admitted with a medical diagnosis of Closed fracture of left distal femur.  She presents with the following impairments/functional limitations:  weakness, impaired functional mobilty, impaired cognition, decreased safety awareness, impaired coordination, impaired cardiopulmonary response to activity, impaired endurance, gait instability, pain, decreased coordination, impaired balance, impaired self care skills, decreased lower extremity function, impaired skin, impaired muscle length, impaired joint extensibility, pt seems pleasantly confused, has good motivation for Tx, but has decreased safety awareness and requires occasional support while sitting or else pt loses balance.    Rehab Prognosis: Fair; patient would benefit from acute skilled PT services to address these deficits and reach maximum level of function.    Recent Surgery: Procedure(s) (LRB):  ORIF, FRACTURE, FEMUR DISTAL (Left) 2 Days Post-Op    Plan:     During this hospitalization, patient to be seen (2-3x/wk) to address the identified rehab impairments via therapeutic activities, therapeutic exercises, wheelchair management/training and progress toward the following goals:    · Plan of Care Expires:  04/02/19    Subjective     Chief Complaint: no particular complaints, pleasant and cooperative  Patient/Family Comments/goals: pt wants to sit up, son stepped out so PT/OT can work and sit up pt  Pain/Comfort:  · Pain Rating 1: (yes, pt did not rate)  · Location - Side 1: Right  · Location 1: leg(ot did not c/o pain to the (L)LE today)  · Pain Addressed 1: Pre-medicate for activity, Reposition, Nurse notified  · Pain Rating Post-Intervention 1: (pt did  not rate)      Objective:     Communicated with ALEX Ryder prior to session.  Patient found supine with bed alarm, peripheral IV, villa catheter upon PT entry to room.     General Precautions: Standard, fall   Orthopedic Precautions:LLE non weight bearing   Braces: Hinged knee brace     Functional Mobility:  · Bed Mobility:     · Rolling Left:  minimum assistance  · Rolling Right: minimum assistance  · Scooting: maximal assistance  · Supine to Sit: maximal assistance  · Sit to Supine: maximal assistance      AM-PAC 6 CLICK MOBILITY  Turning over in bed (including adjusting bedclothes, sheets and blankets)?: 2  Sitting down on and standing up from a chair with arms (e.g., wheelchair, bedside commode, etc.): 1  Moving from lying on back to sitting on the side of the bed?: 2  Moving to and from a bed to a chair (including a wheelchair)?: 1  Need to walk in hospital room?: 1  Climbing 3-5 steps with a railing?: 1  Basic Mobility Total Score: 8       Therapeutic Activities and Exercises:   functional mob as above with OT Co Tx; safety precautions; role and importance of PT; sitting balance and tolerance training; PROM ex to (L) LE x 10 reps    Patient left HOB elevated with all lines intact, call button in reach, bed alarm on, RN notified and son Travis present.    GOALS:   Multidisciplinary Problems     Physical Therapy Goals        Problem: Physical Therapy Goal    Goal Priority Disciplines Outcome Goal Variances Interventions   Physical Therapy Goal     PT, PT/OT Ongoing (interventions implemented as appropriate)     Description:  Goals to be met by: 3/26/2019     Patient will increase functional independence with mobility by performin. Supine to sit with Stand-by Assistance  2. Bed to chair transfer with Minimal Assistance using Slideboard  3. Wheelchair propulsion x100 feet with Stand-by Assistance using B UE's and  right lower extremity  4. Lower extremity exercise program x10 reps per handout, with assistance  as needed             Problem: Physical Therapy Goal    Goal Priority Disciplines Outcome Goal Variances Interventions   Physical Therapy Goal     PT, PT/OT                      Time Tracking:     PT Received On: 03/20/19  PT Start Time: 1423     PT Stop Time: 1433  PT Total Time (min): 10 min     Billable Minutes: Therapeutic Activity 10    Treatment Type: Treatment  PT/PTA: PT     PTA Visit Number: 0     Joel Sierra, PT  03/20/2019

## 2019-03-21 LAB
ABO + RH BLD: NORMAL
ACANTHOCYTES BLD QL SMEAR: PRESENT
ALBUMIN SERPL BCP-MCNC: 2.2 G/DL
ALP SERPL-CCNC: 105 U/L
ALT SERPL W/O P-5'-P-CCNC: 24 U/L
ANION GAP SERPL CALC-SCNC: 6 MMOL/L
ANISOCYTOSIS BLD QL SMEAR: SLIGHT
AST SERPL-CCNC: 55 U/L
BASO STIPL BLD QL SMEAR: ABNORMAL
BASOPHILS # BLD AUTO: 0.03 K/UL
BASOPHILS NFR BLD: 0.3 %
BILIRUB SERPL-MCNC: 1.1 MG/DL
BLD GP AB SCN CELLS X3 SERPL QL: NORMAL
BUN SERPL-MCNC: 17 MG/DL
BURR CELLS BLD QL SMEAR: ABNORMAL
CALCIUM SERPL-MCNC: 8.3 MG/DL
CHLORIDE SERPL-SCNC: 112 MMOL/L
CO2 SERPL-SCNC: 22 MMOL/L
CREAT SERPL-MCNC: 0.7 MG/DL
DIFFERENTIAL METHOD: ABNORMAL
EOSINOPHIL # BLD AUTO: 0.1 K/UL
EOSINOPHIL NFR BLD: 0.9 %
ERYTHROCYTE [DISTWIDTH] IN BLOOD BY AUTOMATED COUNT: 15.9 %
EST. GFR  (AFRICAN AMERICAN): >60 ML/MIN/1.73 M^2
EST. GFR  (NON AFRICAN AMERICAN): >60 ML/MIN/1.73 M^2
GLUCOSE SERPL-MCNC: 74 MG/DL
HCT VFR BLD AUTO: 25.3 %
HGB BLD-MCNC: 8 G/DL
HYPOCHROMIA BLD QL SMEAR: ABNORMAL
LYMPHOCYTES # BLD AUTO: 1.5 K/UL
LYMPHOCYTES NFR BLD: 16.4 %
MCH RBC QN AUTO: 30.1 PG
MCHC RBC AUTO-ENTMCNC: 31.6 G/DL
MCV RBC AUTO: 95 FL
MONOCYTES # BLD AUTO: 1.1 K/UL
MONOCYTES NFR BLD: 12.1 %
NEUTROPHILS # BLD AUTO: 6.5 K/UL
NEUTROPHILS NFR BLD: 70.5 %
PLATELET # BLD AUTO: 243 K/UL
PMV BLD AUTO: 9.7 FL
POLYCHROMASIA BLD QL SMEAR: ABNORMAL
POTASSIUM SERPL-SCNC: 3.9 MMOL/L
PROT SERPL-MCNC: 5.6 G/DL
RBC # BLD AUTO: 2.66 M/UL
SODIUM SERPL-SCNC: 140 MMOL/L
TARGETS BLD QL SMEAR: ABNORMAL
WBC # BLD AUTO: 9.27 K/UL

## 2019-03-21 PROCEDURE — 86901 BLOOD TYPING SEROLOGIC RH(D): CPT | Mod: HCNC

## 2019-03-21 PROCEDURE — 27000221 HC OXYGEN, UP TO 24 HOURS: Mod: HCNC

## 2019-03-21 PROCEDURE — 63600175 PHARM REV CODE 636 W HCPCS: Mod: HCNC | Performed by: EMERGENCY MEDICINE

## 2019-03-21 PROCEDURE — 25000003 PHARM REV CODE 250: Mod: HCNC | Performed by: ORTHOPAEDIC SURGERY

## 2019-03-21 PROCEDURE — 63600175 PHARM REV CODE 636 W HCPCS: Mod: HCNC | Performed by: INTERNAL MEDICINE

## 2019-03-21 PROCEDURE — 94761 N-INVAS EAR/PLS OXIMETRY MLT: CPT | Mod: HCNC

## 2019-03-21 PROCEDURE — 85025 COMPLETE CBC W/AUTO DIFF WBC: CPT | Mod: HCNC

## 2019-03-21 PROCEDURE — 80053 COMPREHEN METABOLIC PANEL: CPT | Mod: HCNC

## 2019-03-21 PROCEDURE — 11000001 HC ACUTE MED/SURG PRIVATE ROOM: Mod: HCNC

## 2019-03-21 PROCEDURE — 36415 COLL VENOUS BLD VENIPUNCTURE: CPT | Mod: HCNC

## 2019-03-21 PROCEDURE — C9113 INJ PANTOPRAZOLE SODIUM, VIA: HCPCS | Mod: HCNC | Performed by: EMERGENCY MEDICINE

## 2019-03-21 PROCEDURE — 25000003 PHARM REV CODE 250: Mod: HCNC | Performed by: INTERNAL MEDICINE

## 2019-03-21 RX ORDER — ENOXAPARIN SODIUM 100 MG/ML
40 INJECTION SUBCUTANEOUS EVERY 24 HOURS
Status: DISCONTINUED | OUTPATIENT
Start: 2019-03-21 | End: 2019-03-22 | Stop reason: HOSPADM

## 2019-03-21 RX ADMIN — MUPIROCIN 1 G: 20 OINTMENT TOPICAL at 08:03

## 2019-03-21 RX ADMIN — MUPIROCIN 1 G: 20 OINTMENT TOPICAL at 09:03

## 2019-03-21 RX ADMIN — ACETAMINOPHEN 650 MG: 325 TABLET, FILM COATED ORAL at 09:03

## 2019-03-21 RX ADMIN — HYDROXYCHLOROQUINE SULFATE 200 MG: 200 TABLET, FILM COATED ORAL at 08:03

## 2019-03-21 RX ADMIN — PANTOPRAZOLE SODIUM 40 MG: 40 INJECTION, POWDER, FOR SOLUTION INTRAVENOUS at 08:03

## 2019-03-21 RX ADMIN — ENOXAPARIN SODIUM 40 MG: 100 INJECTION SUBCUTANEOUS at 05:03

## 2019-03-21 RX ADMIN — FLUOXETINE HYDROCHLORIDE 40 MG: 20 CAPSULE ORAL at 08:03

## 2019-03-21 RX ADMIN — ALLOPURINOL 100 MG: 100 TABLET ORAL at 08:03

## 2019-03-21 RX ADMIN — POLYETHYLENE GLYCOL 3350 17 G: 17 POWDER, FOR SOLUTION ORAL at 08:03

## 2019-03-21 NOTE — ASSESSMENT & PLAN NOTE
S/p ORIF with cement augmentation on 3/18  About 400cc of estimated blood loss  Received one unit of PRBCs. Hgb now stable  PT/OT consulted. Patient is non weight bearing therefore very limited.   Start lovenox for DVT proph  Aiming for non narcotic pain management!   Appreciate further Ortho recs

## 2019-03-21 NOTE — PROGRESS NOTES
[facility] Hospital Medicine  Progress Note    Patient Name: Darrel Bennett  MRN: [id number]  Patient Class: IP- Inpatient   Admission Date: 3/17/2019  Length of Stay: 4 days  Attending Physician: Zee Altman MD  Primary Care Provider: Primary Doctor No        Subjective:     Principal Problem:Closed fracture of left distal femur    HPI:  Darrel Bennett is a 87 y.o. female that (in part)  has a past medical history of Allergy, Arthritis, CAD (coronary artery disease), Chronic pain, DDD (degenerative disc disease), thoracic, Depression, Encounter for blood transfusion, Hammer toe, Idiopathic thrombocytopenic purpura (ITP), Osteoarthritis, Osteoporosis, postmenopausal, Positive NAZANIN (antinuclear antibody), SLE (systemic lupus erythematosus), and Spondylolysis of lumbar region.  has a past surgical history that includes Hysterectomy; Splenectomy, total; right hip athroplasty (Right); and Total knee arthroplasty (Left). Presents to [facility] Emergency Department complaining of left knee pain status post fall as described below in detail.                    Description of symptoms     Location:  Left knee  Onset:  Acute s/p fall  Character:  Sharp aching pain  Frequency:  One episode  Duration:  constant, ataxia and pain w/ weight bearing  Radiation:  From left knee to proximal thigh  Exacerbating factors:  Worsened w/ weight bearing  Relieving factors:  Some relief with pain medications            In the emergency department xray showed left acute distal femur periprosthetic fracture.  This was confirmed with CT of lower extremity. Ortho notified. Planning for ORIF on Monday.     Hospital medicine has been asked to admit for further evaluation and treatment.   Associated Symptoms:  No paresthesias        Hospital Course:  No notes on file    Interval History: feels well today. Has no complaints. CT showed sacral fracture. Per ortho, no surgical intervention warranted.      Review of Systems   Respiratory: Negative.    Cardiovascular: Negative.    Gastrointestinal: Negative.    Musculoskeletal: Negative.      Objective:     Vital Signs (Most Recent):  Temp: 98.1 °F (36.7 °C) (03/21/19 1711)  Pulse: 78 (03/21/19 1711)  Resp: 18 (03/21/19 1711)  BP: 112/61 (03/21/19 1711)  SpO2: (!) 93 % (03/21/19 1711) Vital Signs (24h Range):  Temp:  [98 °F (36.7 °C)-98.9 °F (37.2 °C)] 98.1 °F (36.7 °C)  Pulse:  [78-95] 78  Resp:  [17-19] 18  SpO2:  [88 %-94 %] 93 %  BP: (100-116)/(53-65) 112/61     Weight: 61.1 kg (134 lb 11.2 oz)  Body mass index is 19.89 kg/m².    Intake/Output Summary (Last 24 hours) at 3/21/2019 1728  Last data filed at 3/20/2019 2341  Gross per 24 hour   Intake 468.33 ml   Output --   Net 468.33 ml      Physical Exam   Constitutional: She appears well-developed. No distress.   Cardiovascular: Normal rate and regular rhythm.   Pulmonary/Chest: Effort normal and breath sounds normal.   Abdominal: Soft. Bowel sounds are normal.   Neurological:   Oriented to person only. Cooperative with exam and able to verbalize needs + symptoms   States she recognized me and actually called me by Dr Paz.    Skin: She is not diaphoretic.   Nursing note and vitals reviewed.      Significant Labs: All pertinent labs within the past 24 hours have been reviewed.    Significant Imaging: I have reviewed all pertinent imaging results/findings within the past 24 hours.  I have reviewed and interpreted all pertinent imaging results/findings within the past 24 hours.    Assessment/Plan:      * Closed fracture of left distal femur    S/p ORIF with cement augmentation on 3/18  About 400cc of estimated blood loss  Received one unit of PRBCs. Hgb now stable  PT/OT consulted. Patient is non weight bearing therefore very limited.   Start lovenox for DVT proph  Aiming for non narcotic pain management!   Appreciate further Ortho recs       Hypotension    Patient is asymptomatic, afebrile and without  leukocytosis.   Stop IVFs, encourage PO intake and hold off on antihypertensives  Avoid narcotics!  Monitor closely         Acute blood loss anemia    Stabilized. Will start lovenox for DVT prophylaxis  No need to re-check CBC in AM unless clinically warranted       Chronic kidney disease, stage III (moderate)    Patient's renal function is stable  Leo out  I/Os       Dementia    At risk for delirium  Blinds open during daytime  Reorient frequently  Avoid sedative hypnotics           Coronary artery disease involving native coronary artery of native heart without angina pectoris    No acute issues. Is chest pain free and not complaining of SOB  Not on antiplatelet, BB, ACE-I or cholesterol lowering medication at NH  Will not tolerate BB or ACE-I currently. Will ask ortho about ASA at least.   LDL at goal therefore statin not indicated  Maintain hemodynamic stability with fluids       SLE (systemic lupus erythematosus)    I see no acute issues  Resumed hydroxychloroquine         VTE Risk Mitigation (From admission, onward)        Ordered     enoxaparin injection 40 mg  Daily      03/21/19 1730     IP VTE HIGH RISK PATIENT  Once      03/18/19 2103     Place LUCY hose  Until discontinued      03/17/19 2001          Dispo: back to NH tomorrow    Zee Paz MD  Department of Hospital Medicine   [facility]

## 2019-03-21 NOTE — PROGRESS NOTES
Orthopedic Postop Progress Note    Postop day: 3    ID: The patient is a 87 y.o. female status post: LEFT distal femur ORIF of periprosthetic frature  Nurse reports [place]    Overnight Events: none    Vitals:    03/21/19 1141   BP: (!) 110/53   Pulse: 83   Resp: 18   Temp: 98.1 °F (36.7 °C)       Drain Output  03/20 0701 - 03/21 0700  In: 828.3   Out: -     Physical Exam:  NAD, A/O to baseline  Wound c/d/i with clean dressing.  No focal motor or sensory deficits noted.    Assessment: The patient is a 87 y.o. female status post: ORIF of periprosthetic distal femur fracture and Nondisplaced fracture of the left sacral ala which may be acute.    Plan:  1) Antibiotics: post op ancef completed  2) Weight bearing status: No weight bearing, Patient in splint and hinged knee brace locked in position at all times  3) Labs: post op labs reviewed  4) DVT Prophylaxis: lovenox  5) Lines/Drains: PIV  6) Dispo: No weight bearing, splint and brace, no ROM  7) sitter in room at all times as she has dementia and is inclined to pull her IV out, and want to get out of bed  8) NO surgical intervention for sacral fracture

## 2019-03-21 NOTE — SUBJECTIVE & OBJECTIVE
Interval History: feels well today. Has no complaints. CT showed sacral fracture. Per ortho, no surgical intervention warranted.     Review of Systems   Respiratory: Negative.    Cardiovascular: Negative.    Gastrointestinal: Negative.    Musculoskeletal: Negative.      Objective:     Vital Signs (Most Recent):  Temp: 98.1 °F (36.7 °C) (03/21/19 1711)  Pulse: 78 (03/21/19 1711)  Resp: 18 (03/21/19 1711)  BP: 112/61 (03/21/19 1711)  SpO2: (!) 93 % (03/21/19 1711) Vital Signs (24h Range):  Temp:  [98 °F (36.7 °C)-98.9 °F (37.2 °C)] 98.1 °F (36.7 °C)  Pulse:  [78-95] 78  Resp:  [17-19] 18  SpO2:  [88 %-94 %] 93 %  BP: (100-116)/(53-65) 112/61     Weight: 61.1 kg (134 lb 11.2 oz)  Body mass index is 19.89 kg/m².    Intake/Output Summary (Last 24 hours) at 3/21/2019 1728  Last data filed at 3/20/2019 2341  Gross per 24 hour   Intake 468.33 ml   Output --   Net 468.33 ml      Physical Exam   Constitutional: She appears well-developed. No distress.   Cardiovascular: Normal rate and regular rhythm.   Pulmonary/Chest: Effort normal and breath sounds normal.   Abdominal: Soft. Bowel sounds are normal.   Neurological:   Oriented to person only. Cooperative with exam and able to verbalize needs + symptoms   States she recognized me and actually called me by Dr Paz.    Skin: She is not diaphoretic.   Nursing note and vitals reviewed.      Significant Labs: All pertinent labs within the past 24 hours have been reviewed.    Significant Imaging: I have reviewed all pertinent imaging results/findings within the past 24 hours.  I have reviewed and interpreted all pertinent imaging results/findings within the past 24 hours.

## 2019-03-21 NOTE — ASSESSMENT & PLAN NOTE
Stabilized. Will start lovenox for DVT prophylaxis  No need to re-check CBC in AM unless clinically warranted

## 2019-03-21 NOTE — PLAN OF CARE
Problem: Adult Inpatient Plan of Care  Goal: Plan of Care Review  Outcome: Ongoing (interventions implemented as appropriate)  Pt free from falls, injury or any further trauma throughout shift. Pt awake, oriented to self. Continued medications as ordered. No complaints of pain throughout shift. Weight shifted with use of pillows and wedge. Leg splint and immobilizer on. Safety sitter @ bedside. Pt frequently reoriented, blinds drawn and lights on in room. Pt in no distress. Will cont to monitor.    03/21/19 7314   Plan of Care Review   Plan of Care Reviewed With patient

## 2019-03-22 VITALS
WEIGHT: 134.69 LBS | RESPIRATION RATE: 18 BRPM | TEMPERATURE: 99 F | HEART RATE: 74 BPM | DIASTOLIC BLOOD PRESSURE: 60 MMHG | SYSTOLIC BLOOD PRESSURE: 106 MMHG | HEIGHT: 69 IN | OXYGEN SATURATION: 93 % | BODY MASS INDEX: 19.95 KG/M2

## 2019-03-22 PROCEDURE — 94640 AIRWAY INHALATION TREATMENT: CPT | Mod: HCNC

## 2019-03-22 PROCEDURE — 27000221 HC OXYGEN, UP TO 24 HOURS: Mod: HCNC

## 2019-03-22 PROCEDURE — 63600175 PHARM REV CODE 636 W HCPCS: Mod: HCNC | Performed by: EMERGENCY MEDICINE

## 2019-03-22 PROCEDURE — C9113 INJ PANTOPRAZOLE SODIUM, VIA: HCPCS | Mod: HCNC | Performed by: EMERGENCY MEDICINE

## 2019-03-22 PROCEDURE — 97530 THERAPEUTIC ACTIVITIES: CPT | Mod: HCNC

## 2019-03-22 PROCEDURE — 25000003 PHARM REV CODE 250: Mod: HCNC | Performed by: INTERNAL MEDICINE

## 2019-03-22 PROCEDURE — 25000242 PHARM REV CODE 250 ALT 637 W/ HCPCS: Mod: HCNC | Performed by: INTERNAL MEDICINE

## 2019-03-22 PROCEDURE — 63600175 PHARM REV CODE 636 W HCPCS: Mod: HCNC | Performed by: INTERNAL MEDICINE

## 2019-03-22 RX ORDER — IPRATROPIUM BROMIDE AND ALBUTEROL SULFATE 2.5; .5 MG/3ML; MG/3ML
3 SOLUTION RESPIRATORY (INHALATION) ONCE
Status: COMPLETED | OUTPATIENT
Start: 2019-03-22 | End: 2019-03-22

## 2019-03-22 RX ORDER — DEXTROMETHORPHAN POLISTIREX 30 MG/5 ML
1 SUSPENSION, EXTENDED RELEASE 12 HR ORAL ONCE
Status: COMPLETED | OUTPATIENT
Start: 2019-03-22 | End: 2019-03-22

## 2019-03-22 RX ORDER — TRAMADOL HYDROCHLORIDE 50 MG/1
50 TABLET ORAL EVERY 6 HOURS PRN
Status: DISCONTINUED | OUTPATIENT
Start: 2019-03-22 | End: 2019-03-22 | Stop reason: HOSPADM

## 2019-03-22 RX ORDER — LUBIPROSTONE 24 UG/1
24 CAPSULE ORAL 2 TIMES DAILY WITH MEALS
Status: DISCONTINUED | OUTPATIENT
Start: 2019-03-22 | End: 2019-03-22 | Stop reason: HOSPADM

## 2019-03-22 RX ORDER — ACETAMINOPHEN 10 MG/ML
1000 INJECTION, SOLUTION INTRAVENOUS EVERY 8 HOURS
Status: DISCONTINUED | OUTPATIENT
Start: 2019-03-22 | End: 2019-03-22 | Stop reason: CLARIF

## 2019-03-22 RX ORDER — TRAMADOL HYDROCHLORIDE 50 MG/1
50 TABLET ORAL EVERY 6 HOURS PRN
Qty: 20 TABLET | Refills: 0 | Status: SHIPPED | OUTPATIENT
Start: 2019-03-22

## 2019-03-22 RX ADMIN — PANTOPRAZOLE SODIUM 40 MG: 40 INJECTION, POWDER, FOR SOLUTION INTRAVENOUS at 09:03

## 2019-03-22 RX ADMIN — MINERAL OIL 1 ENEMA: 100 ENEMA RECTAL at 08:03

## 2019-03-22 RX ADMIN — LUBIPROSTONE 24 MCG: 24 CAPSULE, GELATIN COATED ORAL at 10:03

## 2019-03-22 RX ADMIN — MUPIROCIN 1 G: 20 OINTMENT TOPICAL at 09:03

## 2019-03-22 RX ADMIN — HYDROXYCHLOROQUINE SULFATE 200 MG: 200 TABLET, FILM COATED ORAL at 08:03

## 2019-03-22 RX ADMIN — LUBIPROSTONE 24 MCG: 24 CAPSULE, GELATIN COATED ORAL at 04:03

## 2019-03-22 RX ADMIN — TRAMADOL HYDROCHLORIDE 50 MG: 50 TABLET, FILM COATED ORAL at 12:03

## 2019-03-22 RX ADMIN — ENOXAPARIN SODIUM 40 MG: 100 INJECTION SUBCUTANEOUS at 04:03

## 2019-03-22 RX ADMIN — TRAMADOL HYDROCHLORIDE 50 MG: 50 TABLET, FILM COATED ORAL at 08:03

## 2019-03-22 RX ADMIN — POLYETHYLENE GLYCOL 3350 17 G: 17 POWDER, FOR SOLUTION ORAL at 09:03

## 2019-03-22 RX ADMIN — ALLOPURINOL 100 MG: 100 TABLET ORAL at 08:03

## 2019-03-22 RX ADMIN — ACETAMINOPHEN 1000 MG: 10 INJECTION, SOLUTION INTRAVENOUS at 01:03

## 2019-03-22 RX ADMIN — FLUOXETINE HYDROCHLORIDE 40 MG: 20 CAPSULE ORAL at 08:03

## 2019-03-22 RX ADMIN — IPRATROPIUM BROMIDE AND ALBUTEROL SULFATE 3 ML: .5; 3 SOLUTION RESPIRATORY (INHALATION) at 03:03

## 2019-03-22 RX ADMIN — ERGOCALCIFEROL 50000 UNITS: 1.25 CAPSULE ORAL at 10:03

## 2019-03-22 NOTE — PROGRESS NOTES
1419- Patient's Nurse Arline informed that transportation has been arranged for  at 5:00pm;however, [place] is still awaiting call report information.

## 2019-03-22 NOTE — PROGRESS NOTES
TN spoke with Mony at [facility]. Mony stated she is awaiting patient's  SNF auth from [facility]. TN awaiting call back from Mony with call report information.     [id number]- Call report information received from Mony at [facility]. Nurse will report to [facility]-  Nurse Zuñiga at [phone]. Patient going to room 612B.

## 2019-03-22 NOTE — PT/OT/SLP PROGRESS
Occupational Therapy   Treatment    Name: Darrel Bennett  MRN: [id number]  Admitting Diagnosis:  Closed fracture of left distal femur  4 Days Post-Op    Recommendations:     Discharge Recommendations: nursing facility, skilled  Discharge Equipment Recommendations:  none  Barriers to discharge:       Assessment:     Darrel Bennett is a 87 y.o. female with a medical diagnosis of Closed fracture of left distal femur.  She presents with the following performance deficits affecting function: weakness, impaired endurance, impaired self care skills, impaired functional mobilty, gait instability, impaired balance, impaired cognition, decreased upper extremity function, decreased coordination, decreased lower extremity function, pain, decreased safety awareness, impaired coordination, decreased ROM, impaired skin, impaired cardiopulmonary response to activity, orthopedic precautions, impaired joint extensibility, impaired muscle length, edema. Patient limited with OOB mobility secondary to increased SOB/wheezing and decreased O2 sats with activity. Patient's nurse notified.     Rehab Prognosis:  Fair; patient would benefit from acute skilled OT services to address these deficits and reach maximum level of function.       Plan:     Patient to be seen   to address the above listed problems via self-care/home management, therapeutic activities, therapeutic exercises  · Plan of Care Expires:    · Plan of Care Reviewed with: patient, son    Subjective   Patient (and son) verbalized patient would like to sit up for a little while.   Pain/Comfort:  · Pain Rating 1: (unable to rate)  · Location - Side 1: Right  · Location 1: leg  · Pain Addressed 1: Pre-medicate for activity, Reposition, Cessation of Activity, Nurse notified    Objective:     Communicated with: Nurse Nunn prior to session.  Patient found HOB elevated with bed alarm, peripheral IV and son present upon OT entry to room.    General Precautions: Standard, fall    Orthopedic Precautions:LLE non weight bearing   Braces: Hinged knee brace     Occupational Performance:  With bed mobility and upon sitting EOB, patient noted with increased SOB and wheezing (2 L O2 NC in place). Patient assisted back to supine and repositioned in bed. Patient SPO2 taken 83% on 2L O2 NC, SPO2 able to increase to 94-96% with MAX cues for pursed lip breathing technique. Patient's nurse Arline notified.     Bed Mobility:    · Patient completed Rolling/Turning to Left with  minimum assistance  · Patient completed Rolling/Turning to Right with minimum assistance  · Patient completed Scooting/Bridging with total assistance  · Patient completed Supine to Sit with maximal assistance  · Patient completed Sit to Supine with total assistance     Activities of Daily Living:  · Grooming: set up assistance to wash face    · Toileting: dependence; able to roll L<>R with Min A for pericare and brief change due to bladder accident.    [facility] 6 Click ADL: 12    Treatment & Education:  Bed mobility and ADL's as above.  Patient educated on pursed lip breathing technique, able to return demonstration but needs reinforcement for breathing technique upon exertion.    Patient left HOB elevated with pillow between BLE, LLE elevated on pillow all lines intact, call button in reach, bed alarm on, nurse Arline notified and safety sitter presentEducation:      GOALS:   Multidisciplinary Problems     Occupational Therapy Goals        Problem: Occupational Therapy Goal    Goal Priority Disciplines Outcome Interventions   Occupational Therapy Goal     OT, PT/OT Ongoing (interventions implemented as appropriate)    Description:  Goals to be met by: 3/31/19    Patient will increase functional independence with ADLs by performing:    Feeding with Set-up Assistance.  UE Dressing with Minimal Assistance.  Grooming while seated with Supervision.  Sitting at edge of bed x20 minutes with Supervision.  Rolling to Bilateral with Contact  Guard Assistance.   Supine to sit with Contact Guard Assistance.  Toilet transfer to bedside commode with Minimal Assistance.  Upper extremity exercise program x7 reps per handout, with assistance as needed.                      Time Tracking:     OT Date of Treatment: 03/22/19  OT Start Time: 1358  OT Stop Time: 1416  OT Total Time (min): 18 min    Billable Minutes:Therapeutic Activity 18    MARIANELA Macdonald  3/22/2019

## 2019-03-22 NOTE — DISCHARGE SUMMARY
[facility] Hospital Medicine  Discharge Summary      Patient Name: Darrel Bennett  MRN: [id number]  Admission Date: 3/17/2019  Hospital Length of Stay: 5 days  Discharge Date and Time:  03/22/2019 2:24 PM  Attending Physician: Zee Altman MD   Discharging Provider: Zee Paz MD  Primary Care Provider: Primary Doctor No      HPI:   Darrel Bennett is a 87 y.o. female that (in part)  has a past medical history of Allergy, Arthritis, CAD (coronary artery disease), Chronic pain, DDD (degenerative disc disease), thoracic, Depression, Encounter for blood transfusion, Hammer toe, Idiopathic thrombocytopenic purpura (ITP), Osteoarthritis, Osteoporosis, postmenopausal, Positive NAZANIN (antinuclear antibody), SLE (systemic lupus erythematosus), and Spondylolysis of lumbar region.  has a past surgical history that includes Hysterectomy; Splenectomy, total; right hip athroplasty (Right); and Total knee arthroplasty (Left). Presents to [facility] Emergency Department complaining of left knee pain status post fall as described below in detail.                    Description of symptoms     Location:  Left knee  Onset:  Acute s/p fall  Character:  Sharp aching pain  Frequency:  One episode  Duration:  constant, ataxia and pain w/ weight bearing  Radiation:  From left knee to proximal thigh  Exacerbating factors:  Worsened w/ weight bearing  Relieving factors:  Some relief with pain medications            In the emergency department xray showed left acute distal femur periprosthetic fracture.  This was confirmed with CT of lower extremity. Ortho notified. Planning for ORIF on Monday.     Hospital medicine has been asked to admit for further evaluation and treatment.   Associated Symptoms:  No paresthesias        Procedure(s) (LRB):  ORIF, FRACTURE, FEMUR DISTAL (Left)      Hospital Course:   Ms Iglesias presented with traumatic left femur fracture. CT reports location is distal  aspect of femur. Orthopedic surgery consulted. Underwent ORIF with cement augmentation. One unit of PRBCs given for 400 cc of EBL. Hgb intially decreased but then remained stable. Had no signs of active bleeding. Pain management attempted with non narcotics. Did receive tramadol which she uses at the NH. PT/OT consulted. Patient is non weight bearing to left leg and is wheelchair bound at baseline. Unfortunately has non displaced fractures to sacrum of unknown age which are causing pain. Her mobility is quite limited. Orthopedic surgeon discussed with Travis, patient's son. Patient discharged to [facility] with skilled services. Heart healthy diet. Activity as tolerated.      Consults:   Consults (From admission, onward)        Status Ordering Provider     Inpatient consult to Orthopedic Surgery  Once     Provider:  Geo Posada MD    Completed NATASHA BURLESON     IP consult to case management  Once     Provider:  (Not yet assigned)    MOHIT Fernandes        Final Active Diagnoses:    Diagnosis Date Noted POA    PRINCIPAL PROBLEM:  Closed fracture of left distal femur [S72.402A] 03/17/2019 Yes    Hypotension [I95.9] 03/19/2019 No    Acute blood loss anemia [D62] 04/17/2015 Yes    Chronic kidney disease, stage III (moderate) [N18.3] 04/16/2015 Yes    Dementia [F03.90] 01/05/2014 Yes    Coronary artery disease involving native coronary artery of native heart without angina pectoris [I25.10] 11/26/2013 Yes    SLE (systemic lupus erythematosus) [M32.9] 05/01/2013 Yes      Problems Resolved During this Admission:       Discharged Condition: stable    Disposition: Nursing Facility with Pl*    Follow Up: with ortho in [facility]    Medications:  Reconciled Home Medications:      Medication List      CONTINUE taking these medications    acetaminophen 325 MG tablet  Commonly known as:  TYLENOL  Take 2 tablets (650 mg total) by mouth every 6 (six) hours as needed.     allopurinol 100 MG tablet  Commonly  known as:  ZYLOPRIM  Take 1 tablet (100 mg total) by mouth once daily.     docusate sodium 100 MG capsule  Commonly known as:  COLACE  Take 1 capsule (100 mg total) by mouth every 12 (twelve) hours.     hydroxychloroquine 200 mg tablet  Commonly known as:  PLAQUENIL  Take 1 tablet (200 mg total) by mouth once daily.     lubiprostone 24 MCG Cap  Commonly known as:  AMITIZA  Take 1 capsule (24 mcg total) by mouth 2 (two) times daily with meals.     phenazopyridine 200 MG tablet  Commonly known as:  PYRIDIUM     polyethylene glycol 17 gram Pwpk  Commonly known as:  GLYCOLAX  Take 17 g by mouth once daily.     traMADol 50 mg tablet  Commonly known as:  ULTRAM  Take 50 mg by mouth every 6 (six) hours as needed for Pain.            Indwelling Lines/Drains at time of discharge:   Lines/Drains/Airways          None          Time spent on the discharge of patient: > 45 minutes  Patient was seen and examined on the date of discharge and determined to be suitable for discharge.         Zee Paz MD  Department of Hospital Medicine  [facility]

## 2019-03-22 NOTE — PROGRESS NOTES
Post op appointment scheduled.    Follow-up Information     Geo Posada MD On 4/1/2019.    Specialty:  Orthopedic Surgery  Why:  On Monday @ 1:15pm, outpatient services  Contact information:  [address]  [facility]  [place] [address]  [phone]

## 2019-03-22 NOTE — PLAN OF CARE
Problem: Occupational Therapy Goal  Goal: Occupational Therapy Goal  Goals to be met by: 3/31/19    Patient will increase functional independence with ADLs by performing:    Feeding with Set-up Assistance.  UE Dressing with Minimal Assistance.  Grooming while seated with Supervision.  Sitting at edge of bed x20 minutes with Supervision.  Rolling to Bilateral with Contact Guard Assistance.   Supine to sit with Contact Guard Assistance.  Toilet transfer to bedside commode with Minimal Assistance.  Upper extremity exercise program x7 reps per handout, with assistance as needed.     Outcome: Ongoing (interventions implemented as appropriate)  Patient limited during OT session and with OOB mobility secondary to increased SOB/wheezing and decreased O2 sats with activity. Patient's nurse, Arline notified and aware.

## 2019-03-22 NOTE — PROGRESS NOTES
TN contacted Mony at [facility] to inform that patient is ready for discharge and that patient's clinicals were sent via [facility]. Awaiting call report.     1122- TN contacted patient's son Travis to inform that patient will discharge back to [facility] today.

## 2019-03-22 NOTE — PLAN OF CARE
03/22/19 1351   Medicare Message   Important Message from Medicare regarding Discharge Appeal Rights Given to patient/caregiver;Explained to patient/caregiver;Signed/date by patient/caregiver   Date IMM was signed 03/22/19   Time IMM was signed 1930

## 2019-03-22 NOTE — PLAN OF CARE
[facility]     Department of Hospital Medicine     [address] [place] [address]     [phone] after hours  [phone] fax       NURSING HOME ORDERS    Patient Name: Darrel Bennett  YOB: 1931 03/22/2019    Admit to Nursing Home:  Skilled Bed       Diagnoses:  Active Hospital Problems    Diagnosis  POA    *Closed fracture of left distal femur [S72.402A]  Yes    Hypotension [I95.9]  No    Acute blood loss anemia [D62]  Yes    Chronic kidney disease, stage III (moderate) [N18.3]  Yes    Dementia [F03.90]  Yes    Coronary artery disease involving native coronary artery of native heart without angina pectoris [I25.10]  Yes    SLE (systemic lupus erythematosus) [M32.9]  Yes      Resolved Hospital Problems   No resolved problems to display.       Patient is homebound due to:  Closed fracture of left distal femur    Allergies:  Review of patient's allergies indicates:   Allergen Reactions    Penicillins Rash       Vitals: every shift    Diet: regular, mechanical soft                 Special Dysphagia Diet: thin liquids     Supplement:  1 can every three times a day with meals                         Type:   Ensure        Acitivities:    - bed rest   - Weight bearing: non weight bearing    LABS:  Per facility protocol    Nursing Precautions:    - Aspiration precautions:             - assistance with meals            -  Upright 90 degrees befor during and after meals             -  Suction at bedside          - Fall precautions per nursing home protocol   - Seizure precaution per long term protocol   - Decubitus precautions:        -  for positioning   - Pressure reducing foam mattress   - Turn patient every two hours. Use wedge pillows to anchor patient    CONSULTS:      Physical Therapy to evaluate and treat     Occupational Therapy to evaluate and treat     Speech Therapy  to evaluate and treat     Nutrition to evaluate and  recommend diet      MISCELLANEOUS CARE:      Routine Skin for Bedridden Patients:  Apply moisture barrier cream to all    skin folds and wet areas in perineal area daily and after baths and                           all bowel movements.    Nasal cannula as needed for shortness of breath    Lovenox 40 mg IM daily for 3 weeks for DVT prophylaxis    Medications: Discontinue all previous medication orders, if any. See new list below.     Darrel Bennett   Home Medication Instructions DIAMOND:[id number]    Printed on:03/22/19 8067   Medication Information                      acetaminophen (TYLENOL) 325 MG tablet  Take 2 tablets (650 mg total) by mouth every 6 (six) hours as needed for moderate pain.             allopurinol (ZYLOPRIM) 100 MG tablet  Take 1 tablet (100 mg total) by mouth once daily.             amlodipine (NORVASC) 5 MG tablet  Take 5 mg by mouth once daily. Hold this medication if blood pressure less than 140/90 mmHg. Treat pain before considering blood pressure medication             docusate sodium (COLACE) 100 MG capsule  Take 1 capsule (100 mg total) by mouth every 12 (twelve) hours.             hydroxychloroquine (PLAQUENIL) 200 mg tablet  Take 1 tablet (200 mg total) by mouth once daily.             lubiprostone (AMITIZA) 24 MCG Cap  Take 1 capsule (24 mcg total) by mouth 2 (two) times daily with meals.             polyethylene glycol (GLYCOLAX) 17 gram PwPk  Take 17 g by mouth once daily.             tramadol (ULTRAM) 50 mg tablet  Take 50 mg by mouth every 6 (six) hours as needed for severe Pain.                 Electronically signed  _________________________________  Zee Paz MD  03/22/2019

## 2019-03-22 NOTE — HOSPITAL COURSE
Ms Iglesias presented with traumatic left femur fracture. CT reports location is distal aspect of femur. Orthopedic surgery consulted. Underwent ORIF with cement augmentation. One unit of PRBCs given for 400 cc of EBL. Hgb intially decreased but then remained stable. Had no signs of active bleeding. Pain management attempted with non narcotics. Did receive tramadol which she uses at the NH. PT/OT consulted. Patient is non weight bearing to left leg and is wheelchair bound at baseline. Unfortunately has non displaced fractures to sacrum of unknown age which are causing pain. Her mobility is quite limited. Orthopedic surgeon discussed with Travis, patient's son. Patient discharged to [facility] with skilled services. Heart healthy diet. Activity as tolerated.

## 2019-03-22 NOTE — PLAN OF CARE
Call report information provided to Nurse Nunn.    Nurse Nunn informed that patient can discharge from  standpoint.    0732- TN contacted Dispatcher Sanford to clarify  time. Patient's transportation scheduled for  at 5:00pm.     Patient's post op appointment sent to [facility] via [facility].      03/22/19 1648   Final Note   Assessment Type Final Discharge Note   Anticipated Discharge Disposition SNF   What phone number can be called within the next 1-3 days to see how you are doing after discharge? ([phone])   Right [facility] Referral Info   Post Acute Recommendation SNF / Sub-Acute Rehab   Facility Name ([facility])

## 2019-03-22 NOTE — NURSING
MD notified that pt was at 89% on RA, replaced 2 L NC. Will notify during report to NH that pt on 2 L NC. Will cont to monitor.

## 2019-03-22 NOTE — NURSING
Attempted to call [facility] for report, phone rang busy when transffered to nurse. 3rd attempt, was hung up on. Will attempt again.

## 2019-03-22 NOTE — NURSING
Dr. Paz notified of SOB, wheezing and O2 of 83% attempting to sit on side of bed with OT. Pt then laid back down and O2 improved to 94-97%. New orders given per MD. Will cont to monitor.

## 2019-03-22 NOTE — PROGRESS NOTES
1350- ADT 30 order placed for  Transportation.      ETA: 5:00PM, Patient to travel via stretcher with oxygen with [facility] transportation.      If transportation does not arrive at ETA time nurse will be instructed to follow protocol for transportation below:  How can I get in touch directly with dispatch, if needed?                 Non-emergent dispatch: [phone]                                    Emergent dispatch: [phone]  Non-emergent (stretcher): [phone]  Escalation Needs (PFC Lead): [phone]-Transportation order reinstated by Dispatcher Alayna.  at 5:00PM.

## 2019-03-22 NOTE — PROGRESS NOTES
TN sent patient's clinicals (Packet, POC, MD notes, PT notes, and Mar) to [facility]. Awaiting feedback.

## 2019-03-22 NOTE — PROGRESS NOTES
Orthopedic Postop Progress Note    Postop day: 4    ID: The patient is a 87 y.o. female status post: LEFT distal femur ORIF of periprosthetic frature  Nurse reports RADHA    Overnight Events: none    Vitals:    03/22/19 0817   BP: (!) 159/82   Pulse: 79   Resp: 18   Temp: 98.5 °F (36.9 °C)       Drain Output  03/21 0701 - 03/22 0700  In: 120   Out: -     Physical Exam:  NAD, A/O to baseline  Wound c/d/i with clean dressing.  No focal motor or sensory deficits noted.    Assessment: The patient is a 87 y.o. female status post: ORIF of periprosthetic distal femur fracture and Nondisplaced fracture of the left sacral ala which may be acute and is non-displaced.    Plan:  1) Antibiotics: post op ancef completed  2) Weight bearing status: No weight bearing, Patient in splint and hinged knee brace locked in position at all times  3) Labs: post op labs reviewed  4) DVT Prophylaxis: lovenox  5) Lines/Drains: PIV  6) Dispo: No weight bearing, splint and brace, no ROM  7) sitter in room at all times as she has dementia and is inclined to pull her IV out, and want to get out of bed  8) NO surgical intervention for sacral fracture

## 2019-03-22 NOTE — NURSING
MD notified of pain to R knee not L leg and son stating to R leg was not contracted prior to coming in. MD notified. New order given IV tylenol. Will cont to monitor.

## 2019-05-30 ENCOUNTER — HOSPITAL ENCOUNTER (OUTPATIENT)
Dept: RADIOLOGY | Facility: HOSPITAL | Age: 84
Discharge: HOME | End: 2019-05-30
Attending: ORTHOPAEDIC SURGERY
Payer: MEDICARE

## 2019-05-30 DIAGNOSIS — I82.409 DEEP VEIN THROMBOSIS: Primary | ICD-10-CM

## 2019-05-30 DIAGNOSIS — M79.662 PAIN OF LEFT CALF: ICD-10-CM

## 2019-05-30 DIAGNOSIS — M79.662 PAIN OF LEFT CALF: Primary | ICD-10-CM

## 2019-05-30 DIAGNOSIS — R22.40 LOCALIZED SWELLING, MASS AND LUMP, LOWER LIMB: ICD-10-CM

## 2019-05-30 DIAGNOSIS — M79.89 SWELLING OF LIMB: ICD-10-CM

## 2019-05-30 PROCEDURE — 93971 EXTREMITY STUDY: CPT | Mod: 26,HCNC,LT, | Performed by: RADIOLOGY

## 2019-05-30 PROCEDURE — 93971 EXTREMITY STUDY: CPT | Mod: TC,HCNC,LT

## 2019-05-30 PROCEDURE — 93971 US LOWER EXTREMITY VEINS LEFT: ICD-10-PCS | Mod: 26,HCNC,LT, | Performed by: RADIOLOGY

## 2019-08-12 ENCOUNTER — PES CALL (OUTPATIENT)
Dept: ADMINISTRATIVE | Facility: CLINIC | Age: 84
End: 2019-08-12

## 2023-07-11 NOTE — PLAN OF CARE
"   03/20/19 1258   Discharge Assessment   Assessment Type Discharge Planning Assessment   Confirmed/corrected address and phone number on facesheet? Yes   Assessment information obtained from? Other  ((daughter) Mana [phone])   Communicated expected length of stay with patient/caregiver no   Prior to hospitilization cognitive status: Not Oriented to Time;Not Oriented to Place   Prior to hospitalization functional status: Needs Assistance;Wheelchair Bound   Current cognitive status: Not Oriented to Place;Not Oriented to Time   Current Functional Status: Needs Assistance;Wheelchair Bound   Facility Arrived From: Patient is from [facility]   Able to Return to Prior Arrangements yes   Is patient able to care for self after discharge? No   Patient's perception of discharge disposition (FRANCIS)   Readmission Within the Last 30 Days no previous admission in last 30 days   Patient currently being followed by outpatient case management? No   Patient currently receives any other outside agency services? No   Equipment Currently Used at Home wheelchair   Do you have any problems affording any of your prescribed medications? No   Is the patient taking medications as prescribed? yes   Does the patient have transportation home? Yes   Transportation Anticipated health plan transportation   Does the patient receive services at the [facility]? No   Discharge Plan A Return to nursing home   Patient/Family in Agreement with Plan unable to assess   Does the patient have transportation to healthcare appointments? Yes     TN explained  role "Help manage care at Home" to haily Adair during our phone conversation, blue folder at bedside "My health Packet" reviewed pink and green stickers on folder with daughter, voiced understanding.      [facility] [employer]  [address]  [address]  [place] [address]  Phone: [phone] Fax: [phone]      " Private car

## 2023-12-20 NOTE — PT/OT/SLP PROGRESS
Physical Therapy      Patient Name:  Darrel Bennett   MRN:  [id number]    Patient not seen today secondary to Other (Comment), pt very fatigued and increased SOB, almost gasping for air; CEJA just finished working with pt and stated that pt's O2 sats were in the 80s at this time. Will follow-up as appropriate.    Joel Sierra, PT, DPT     negative

## 2024-10-14 ENCOUNTER — PATIENT MESSAGE (OUTPATIENT)
Dept: GASTROENTEROLOGY | Facility: CLINIC | Age: 89
End: 2024-10-14
Payer: MEDICARE

## (undated) DEVICE — BANDAGE ACE ELASTIC 6"

## (undated) DEVICE — COVER OVERHEAD SURG LT BLUE

## (undated) DEVICE — APPLICATOR CHLORAPREP ORN 26ML

## (undated) DEVICE — SOL NACL 0.9% INJ PF/100 150

## (undated) DEVICE — Device

## (undated) DEVICE — DRAPE C-ARM FOR MOBILE XRAY

## (undated) DEVICE — GUIDEWIRE DRILL TIP 2.5X300

## (undated) DEVICE — ELECTRODE REM PLYHSV RETURN 9

## (undated) DEVICE — KIT TOTAL HIP OPTIVAC

## (undated) DEVICE — DRESSING COVER AQUACEL AG SURG

## (undated) DEVICE — SUT CTD VICRYL 0 UND BR CT

## (undated) DEVICE — SYR 10CC LUER LOCK

## (undated) DEVICE — SEE MEDLINE ITEM 146345

## (undated) DEVICE — PULSAVAC ZIMMER

## (undated) DEVICE — GLOVE SURGICAL LATEX SZ 8

## (undated) DEVICE — PAD CAST SPECIALIST STRL 6

## (undated) DEVICE — BLANKET UPPER BODY 78.7X29.9IN

## (undated) DEVICE — GAUZE SPONGE 4X4 12PLY

## (undated) DEVICE — SUT 2/0 36IN COATED VICRYL

## (undated) DEVICE — BIT DRILL 3.2

## (undated) DEVICE — CANISTER SUCTION 2 LTR

## (undated) DEVICE — SEE MEDLINE ITEM 157150

## (undated) DEVICE — SPLINT CAST ROLL 3IN X 15FT

## (undated) DEVICE — SOL IRR NACL .9% 3000ML

## (undated) DEVICE — BIT DRILL